# Patient Record
Sex: FEMALE | Race: WHITE | NOT HISPANIC OR LATINO | URBAN - METROPOLITAN AREA
[De-identification: names, ages, dates, MRNs, and addresses within clinical notes are randomized per-mention and may not be internally consistent; named-entity substitution may affect disease eponyms.]

---

## 2019-07-15 ENCOUNTER — OUTPATIENT (OUTPATIENT)
Dept: OUTPATIENT SERVICES | Facility: HOSPITAL | Age: 54
LOS: 1 days | Discharge: HOME | End: 2019-07-15
Payer: COMMERCIAL

## 2019-07-15 VITALS
TEMPERATURE: 98 F | DIASTOLIC BLOOD PRESSURE: 83 MMHG | SYSTOLIC BLOOD PRESSURE: 136 MMHG | OXYGEN SATURATION: 99 % | WEIGHT: 203.05 LBS | HEIGHT: 66 IN | RESPIRATION RATE: 16 BRPM | HEART RATE: 74 BPM

## 2019-07-15 DIAGNOSIS — Z90.89 ACQUIRED ABSENCE OF OTHER ORGANS: Chronic | ICD-10-CM

## 2019-07-15 DIAGNOSIS — Z98.890 OTHER SPECIFIED POSTPROCEDURAL STATES: Chronic | ICD-10-CM

## 2019-07-15 DIAGNOSIS — Z98.891 HISTORY OF UTERINE SCAR FROM PREVIOUS SURGERY: Chronic | ICD-10-CM

## 2019-07-15 LAB
ABO RH CONFIRMATION: SIGNIFICANT CHANGE UP
ALBUMIN SERPL ELPH-MCNC: 4.2 G/DL — SIGNIFICANT CHANGE UP (ref 3.3–5)
ALP SERPL-CCNC: 69 U/L — SIGNIFICANT CHANGE UP (ref 40–120)
ALT FLD-CCNC: 36 U/L — SIGNIFICANT CHANGE UP (ref 12–78)
ANION GAP SERPL CALC-SCNC: 7 MMOL/L — SIGNIFICANT CHANGE UP (ref 5–17)
APPEARANCE UR: CLEAR — SIGNIFICANT CHANGE UP
AST SERPL-CCNC: 13 U/L — LOW (ref 15–37)
BACTERIA # UR AUTO: ABNORMAL
BASOPHILS # BLD AUTO: 0.02 K/UL — SIGNIFICANT CHANGE UP (ref 0–0.2)
BASOPHILS NFR BLD AUTO: 0.3 % — SIGNIFICANT CHANGE UP (ref 0–2)
BILIRUB SERPL-MCNC: 0.3 MG/DL — SIGNIFICANT CHANGE UP (ref 0.2–1.2)
BILIRUB UR-MCNC: NEGATIVE — SIGNIFICANT CHANGE UP
BLD GP AB SCN SERPL QL: SIGNIFICANT CHANGE UP
BUN SERPL-MCNC: 24 MG/DL — HIGH (ref 7–23)
CALCIUM SERPL-MCNC: 9.5 MG/DL — SIGNIFICANT CHANGE UP (ref 8.5–10.1)
CHLORIDE SERPL-SCNC: 107 MMOL/L — SIGNIFICANT CHANGE UP (ref 96–108)
CO2 SERPL-SCNC: 29 MMOL/L — SIGNIFICANT CHANGE UP (ref 22–31)
COLOR SPEC: YELLOW — SIGNIFICANT CHANGE UP
CREAT SERPL-MCNC: 0.63 MG/DL — SIGNIFICANT CHANGE UP (ref 0.5–1.3)
DIFF PNL FLD: ABNORMAL
EOSINOPHIL # BLD AUTO: 0.09 K/UL — SIGNIFICANT CHANGE UP (ref 0–0.5)
EOSINOPHIL NFR BLD AUTO: 1.3 % — SIGNIFICANT CHANGE UP (ref 0–6)
EPI CELLS # UR: ABNORMAL
GLUCOSE SERPL-MCNC: 95 MG/DL — SIGNIFICANT CHANGE UP (ref 70–99)
GLUCOSE UR QL: NEGATIVE MG/DL — SIGNIFICANT CHANGE UP
HCT VFR BLD CALC: 41.4 % — SIGNIFICANT CHANGE UP (ref 34.5–45)
HGB BLD-MCNC: 13.6 G/DL — SIGNIFICANT CHANGE UP (ref 11.5–15.5)
IMM GRANULOCYTES NFR BLD AUTO: 0.3 % — SIGNIFICANT CHANGE UP (ref 0–1.5)
KETONES UR-MCNC: NEGATIVE — SIGNIFICANT CHANGE UP
LEUKOCYTE ESTERASE UR-ACNC: ABNORMAL
LYMPHOCYTES # BLD AUTO: 1.94 K/UL — SIGNIFICANT CHANGE UP (ref 1–3.3)
LYMPHOCYTES # BLD AUTO: 27.1 % — SIGNIFICANT CHANGE UP (ref 13–44)
MCHC RBC-ENTMCNC: 30.2 PG — SIGNIFICANT CHANGE UP (ref 27–34)
MCHC RBC-ENTMCNC: 32.9 GM/DL — SIGNIFICANT CHANGE UP (ref 32–36)
MCV RBC AUTO: 91.8 FL — SIGNIFICANT CHANGE UP (ref 80–100)
MONOCYTES # BLD AUTO: 0.61 K/UL — SIGNIFICANT CHANGE UP (ref 0–0.9)
MONOCYTES NFR BLD AUTO: 8.5 % — SIGNIFICANT CHANGE UP (ref 2–14)
NEUTROPHILS # BLD AUTO: 4.48 K/UL — SIGNIFICANT CHANGE UP (ref 1.8–7.4)
NEUTROPHILS NFR BLD AUTO: 62.5 % — SIGNIFICANT CHANGE UP (ref 43–77)
NITRITE UR-MCNC: NEGATIVE — SIGNIFICANT CHANGE UP
PH UR: 5 — SIGNIFICANT CHANGE UP (ref 5–8)
PLATELET # BLD AUTO: 182 K/UL — SIGNIFICANT CHANGE UP (ref 150–400)
POTASSIUM SERPL-MCNC: 4.5 MMOL/L — SIGNIFICANT CHANGE UP (ref 3.5–5.3)
POTASSIUM SERPL-SCNC: 4.5 MMOL/L — SIGNIFICANT CHANGE UP (ref 3.5–5.3)
PROT SERPL-MCNC: 7.4 GM/DL — SIGNIFICANT CHANGE UP (ref 6–8.3)
PROT UR-MCNC: NEGATIVE MG/DL — SIGNIFICANT CHANGE UP
RBC # BLD: 4.51 M/UL — SIGNIFICANT CHANGE UP (ref 3.8–5.2)
RBC # FLD: 12.5 % — SIGNIFICANT CHANGE UP (ref 10.3–14.5)
RBC CASTS # UR COMP ASSIST: SIGNIFICANT CHANGE UP /HPF (ref 0–4)
SODIUM SERPL-SCNC: 143 MMOL/L — SIGNIFICANT CHANGE UP (ref 135–145)
SP GR SPEC: 1.02 — SIGNIFICANT CHANGE UP (ref 1.01–1.02)
TYPE + AB SCN PNL BLD: SIGNIFICANT CHANGE UP
UROBILINOGEN FLD QL: NEGATIVE MG/DL — SIGNIFICANT CHANGE UP
WBC # BLD: 7.16 K/UL — SIGNIFICANT CHANGE UP (ref 3.8–10.5)
WBC # FLD AUTO: 7.16 K/UL — SIGNIFICANT CHANGE UP (ref 3.8–10.5)
WBC UR QL: SIGNIFICANT CHANGE UP

## 2019-07-15 PROCEDURE — 93010 ELECTROCARDIOGRAM REPORT: CPT

## 2019-07-15 NOTE — H&P PST ADULT - ASSESSMENT
54 year old female presents to PST for planned D&C hysteroscopy polypectomy 	    Plan:  1. PST instructions given ; NPO post midnight   2. Pt instructed to take following meds with sip of water : levothyroxine   3. Urine for pregnancy on day of surgery   4. Medical Optimization  with Dr Massey   5. Stop NSAIDS ( Aspirin Alev Motrin Mobic Diclofenac), herbal supplements , MVI , Vitamin fish oil 7 days prior to surgery  unless directed by surgeon or cardiologist;   6. Labs EKG done 54 year old female presents to PST for planned D&C hysteroscopy polypectomy 	    Plan:  1. PST instructions given ; NPO post midnight   2. Pt instructed to take following meds with sip of water : levothyroxine   3. Urine for pregnancy on day of surgery   4. Medical Optimization  with Dr Massey   5. Stop NSAIDS ( Aspirin Alev Motrin Mobic Diclofenac), herbal supplements , MVI , Vitamin fish oil 7 days prior to surgery  unless directed by surgeon or cardiologist;   6. Labs EKG done   7. OR booking notified Tawnya  latex allergy

## 2019-07-15 NOTE — H&P PST ADULT - HISTORY OF PRESENT ILLNESS
54 year old female with uterine polyp denies abdominal pain  presents to PST for planned D&C hysteroscopy polypectomy

## 2019-07-15 NOTE — H&P PST ADULT - NSICDXPASTMEDICALHX_GEN_ALL_CORE_FT
PAST MEDICAL HISTORY:  Abnormal EKG     Female bladder prolapse     GERD (gastroesophageal reflux disease)     Hypothyroid     OAB (overactive bladder)     Seasonal allergies PAST MEDICAL HISTORY:  Abnormal EKG     Female bladder prolapse     GERD (gastroesophageal reflux disease)     Hypothyroid     OAB (overactive bladder)     Polyp, corpus uteri     Seasonal allergies

## 2019-07-25 ENCOUNTER — OUTPATIENT (OUTPATIENT)
Dept: OUTPATIENT SERVICES | Facility: HOSPITAL | Age: 54
LOS: 1 days | Discharge: HOME | End: 2019-07-25
Payer: COMMERCIAL

## 2019-07-25 ENCOUNTER — RESULT REVIEW (OUTPATIENT)
Age: 54
End: 2019-07-25

## 2019-07-25 VITALS
RESPIRATION RATE: 12 BRPM | OXYGEN SATURATION: 100 % | HEART RATE: 60 BPM | DIASTOLIC BLOOD PRESSURE: 78 MMHG | SYSTOLIC BLOOD PRESSURE: 122 MMHG | TEMPERATURE: 98 F

## 2019-07-25 VITALS
WEIGHT: 203.05 LBS | HEART RATE: 80 BPM | SYSTOLIC BLOOD PRESSURE: 120 MMHG | DIASTOLIC BLOOD PRESSURE: 77 MMHG | TEMPERATURE: 98 F | RESPIRATION RATE: 16 BRPM | HEIGHT: 66 IN | OXYGEN SATURATION: 97 %

## 2019-07-25 DIAGNOSIS — Z98.891 HISTORY OF UTERINE SCAR FROM PREVIOUS SURGERY: Chronic | ICD-10-CM

## 2019-07-25 DIAGNOSIS — Z90.89 ACQUIRED ABSENCE OF OTHER ORGANS: Chronic | ICD-10-CM

## 2019-07-25 DIAGNOSIS — Z98.890 OTHER SPECIFIED POSTPROCEDURAL STATES: Chronic | ICD-10-CM

## 2019-07-25 LAB — HCG UR QL: NEGATIVE — SIGNIFICANT CHANGE UP

## 2019-07-25 PROCEDURE — 88305 TISSUE EXAM BY PATHOLOGIST: CPT | Mod: 26

## 2019-07-25 RX ORDER — HYDROMORPHONE HYDROCHLORIDE 2 MG/ML
0.5 INJECTION INTRAMUSCULAR; INTRAVENOUS; SUBCUTANEOUS
Refills: 0 | Status: DISCONTINUED | OUTPATIENT
Start: 2019-07-25 | End: 2019-07-25

## 2019-07-25 RX ORDER — OXYCODONE HYDROCHLORIDE 5 MG/1
10 TABLET ORAL ONCE
Refills: 0 | Status: DISCONTINUED | OUTPATIENT
Start: 2019-07-25 | End: 2019-07-25

## 2019-07-25 RX ORDER — FENTANYL CITRATE 50 UG/ML
50 INJECTION INTRAVENOUS
Refills: 0 | Status: DISCONTINUED | OUTPATIENT
Start: 2019-07-25 | End: 2019-07-25

## 2019-07-25 RX ORDER — ACETAMINOPHEN 500 MG
975 TABLET ORAL ONCE
Refills: 0 | Status: COMPLETED | OUTPATIENT
Start: 2019-07-25 | End: 2019-07-25

## 2019-07-25 RX ORDER — FAMOTIDINE 10 MG/ML
20 INJECTION INTRAVENOUS ONCE
Refills: 0 | Status: COMPLETED | OUTPATIENT
Start: 2019-07-25 | End: 2019-07-25

## 2019-07-25 RX ORDER — ONDANSETRON 8 MG/1
4 TABLET, FILM COATED ORAL ONCE
Refills: 0 | Status: DISCONTINUED | OUTPATIENT
Start: 2019-07-25 | End: 2019-07-25

## 2019-07-25 RX ORDER — SODIUM CHLORIDE 9 MG/ML
1000 INJECTION, SOLUTION INTRAVENOUS
Refills: 0 | Status: DISCONTINUED | OUTPATIENT
Start: 2019-07-25 | End: 2019-07-25

## 2019-07-25 RX ORDER — OXYCODONE HYDROCHLORIDE 5 MG/1
5 TABLET ORAL ONCE
Refills: 0 | Status: DISCONTINUED | OUTPATIENT
Start: 2019-07-25 | End: 2019-07-25

## 2019-07-25 RX ADMIN — Medication 975 MILLIGRAM(S): at 12:18

## 2019-07-25 RX ADMIN — OXYCODONE HYDROCHLORIDE 5 MILLIGRAM(S): 5 TABLET ORAL at 15:00

## 2019-07-25 RX ADMIN — FAMOTIDINE 20 MILLIGRAM(S): 10 INJECTION INTRAVENOUS at 12:17

## 2019-07-25 RX ADMIN — OXYCODONE HYDROCHLORIDE 5 MILLIGRAM(S): 5 TABLET ORAL at 15:27

## 2019-07-25 NOTE — ASU DISCHARGE PLAN (ADULT/PEDIATRIC) - CALL YOUR DOCTOR IF YOU HAVE ANY OF THE FOLLOWING:
Pain not relieved by Medications/heavy vaginal bleeding/Unable to urinate/Inability to tolerate liquids or foods

## 2019-07-25 NOTE — ASU DISCHARGE PLAN (ADULT/PEDIATRIC) - CARE PROVIDER_API CALL
Tomasz Carrera)  Obstetrics and Gynecology  [address] [place] [address]  Phone: [phone]  Fax: [phone]  Follow Up Time: 2 weeks

## 2019-07-25 NOTE — BRIEF OPERATIVE NOTE - NSICDXBRIEFPROCEDURE_GEN_ALL_CORE_FT
PROCEDURES:  Hysteroscopic polypectomy using MyoSure tissue removal system 25-Jul-2019 14:06:32  Tomasz Carrera  Dilation and curettage, uterus, fractional 25-Jul-2019 14:06:19  Tomasz Carrera  Diagnostic hysteroscopy 25-Jul-2019 14:06:12  Tomasz Carrera  Exam under anesthesia, gynecologic 25-Jul-2019 14:06:01  Tomasz Carrera

## 2019-07-25 NOTE — ASU PATIENT PROFILE, ADULT - PMH
Abnormal EKG    Female bladder prolapse    GERD (gastroesophageal reflux disease)    Hyperlipidemia    Hypothyroid    OAB (overactive bladder)    Polyp, corpus uteri    Seasonal allergies

## 2019-07-25 NOTE — BRIEF OPERATIVE NOTE - OPERATION/FINDINGS
1 cm endometrial polyp; symmetric cavity otherwise 1 cm endometrial polyp; symmetric cavity --- endometrium otherwise atrophic

## 2019-07-30 LAB — SURGICAL PATHOLOGY STUDY: SIGNIFICANT CHANGE UP

## 2019-08-01 DIAGNOSIS — N84.0 POLYP OF CORPUS UTERI: ICD-10-CM

## 2019-08-01 DIAGNOSIS — E55.9 VITAMIN D DEFICIENCY, UNSPECIFIED: ICD-10-CM

## 2019-08-01 DIAGNOSIS — Z79.82 LONG TERM (CURRENT) USE OF ASPIRIN: ICD-10-CM

## 2019-08-01 DIAGNOSIS — J30.2 OTHER SEASONAL ALLERGIC RHINITIS: ICD-10-CM

## 2019-08-01 DIAGNOSIS — K21.9 GASTRO-ESOPHAGEAL REFLUX DISEASE WITHOUT ESOPHAGITIS: ICD-10-CM

## 2019-08-01 DIAGNOSIS — Z91.040 LATEX ALLERGY STATUS: ICD-10-CM

## 2019-08-01 DIAGNOSIS — N87.9 DYSPLASIA OF CERVIX UTERI, UNSPECIFIED: ICD-10-CM

## 2019-08-01 DIAGNOSIS — N85.4 MALPOSITION OF UTERUS: ICD-10-CM

## 2019-08-01 DIAGNOSIS — E78.00 PURE HYPERCHOLESTEROLEMIA, UNSPECIFIED: ICD-10-CM

## 2019-08-01 DIAGNOSIS — K58.9 IRRITABLE BOWEL SYNDROME WITHOUT DIARRHEA: ICD-10-CM

## 2019-08-01 DIAGNOSIS — E03.9 HYPOTHYROIDISM, UNSPECIFIED: ICD-10-CM

## 2019-08-01 DIAGNOSIS — N32.81 OVERACTIVE BLADDER: ICD-10-CM

## 2020-07-01 PROBLEM — E03.9 HYPOTHYROIDISM, UNSPECIFIED: Chronic | Status: ACTIVE | Noted: 2019-07-15

## 2020-07-01 PROBLEM — N32.81 OVERACTIVE BLADDER: Chronic | Status: ACTIVE | Noted: 2019-07-15

## 2020-07-01 PROBLEM — R94.31 ABNORMAL ELECTROCARDIOGRAM [ECG] [EKG]: Chronic | Status: ACTIVE | Noted: 2019-07-15

## 2020-07-01 PROBLEM — N84.0 POLYP OF CORPUS UTERI: Chronic | Status: ACTIVE | Noted: 2019-07-15

## 2020-07-01 PROBLEM — N81.10 CYSTOCELE, UNSPECIFIED: Chronic | Status: ACTIVE | Noted: 2019-07-15

## 2020-07-01 PROBLEM — E78.5 HYPERLIPIDEMIA, UNSPECIFIED: Chronic | Status: ACTIVE | Noted: 2019-07-25

## 2020-07-01 PROBLEM — J30.2 OTHER SEASONAL ALLERGIC RHINITIS: Chronic | Status: ACTIVE | Noted: 2019-07-15

## 2020-07-01 PROBLEM — K21.9 GASTRO-ESOPHAGEAL REFLUX DISEASE WITHOUT ESOPHAGITIS: Chronic | Status: ACTIVE | Noted: 2019-07-15

## 2020-07-23 ENCOUNTER — APPOINTMENT (OUTPATIENT)
Dept: GASTROENTEROLOGY | Facility: CLINIC | Age: 55
End: 2020-07-23
Payer: COMMERCIAL

## 2020-07-23 VITALS
SYSTOLIC BLOOD PRESSURE: 122 MMHG | HEART RATE: 91 BPM | WEIGHT: 206 LBS | HEIGHT: 66 IN | BODY MASS INDEX: 33.11 KG/M2 | DIASTOLIC BLOOD PRESSURE: 85 MMHG | TEMPERATURE: 98.7 F

## 2020-07-23 DIAGNOSIS — Z86.010 PERSONAL HISTORY OF COLONIC POLYPS: ICD-10-CM

## 2020-07-23 PROCEDURE — 99202 OFFICE O/P NEW SF 15 MIN: CPT

## 2020-07-23 NOTE — ASSESSMENT
[FreeTextEntry1] : 56yo female for surveillance colonoscopy\par check colonoscopy\par Risks and benefits of procedure(s) discussed with patient in detail, including but not limited to, perforation, bleeding, reaction to anesthesia, missed lesions.\par

## 2020-07-23 NOTE — HISTORY OF PRESENT ILLNESS
[de-identified] : 56yo female for f/u hx colon polyps\par She has hx polyps with last colonscopy 5 years ago\par She is asymptomatic without bleeding or change in bowel habits

## 2020-07-23 NOTE — REASON FOR VISIT
[Initial Eval - Existing Diagnosis] : an initial evaluation of an existing diagnosis [FreeTextEntry1] : hx polyps

## 2020-07-23 NOTE — PHYSICAL EXAM
[General Appearance - In No Acute Distress] : in no acute distress [General Appearance - Alert] : alert [Sclera] : the sclera and conjunctiva were normal [Extraocular Movements] : extraocular movements were intact [PERRL With Normal Accommodation] : pupils were equal in size, round, and reactive to light [FreeTextEntry1] : left eye sty - seeing ophtho today [Neck Cervical Mass (___cm)] : no neck mass was observed [Neck Appearance] : the appearance of the neck was normal [Thyroid Diffuse Enlargement] : the thyroid was not enlarged [Jugular Venous Distention Increased] : there was no jugular-venous distention [Thyroid Nodule] : there were no palpable thyroid nodules [Auscultation Breath Sounds / Voice Sounds] : lungs were clear to auscultation bilaterally [Heart Rate And Rhythm] : heart rate was normal and rhythm regular [Heart Sounds] : normal S1 and S2 [Heart Sounds Gallop] : no gallops [Murmurs] : no murmurs [Heart Sounds Pericardial Friction Rub] : no pericardial rub [Bowel Sounds] : normal bowel sounds [Abdomen Soft] : soft [Abdomen Tenderness] : non-tender [] : no hepato-splenomegaly [Abdomen Mass (___ Cm)] : no abdominal mass palpated [Nail Clubbing] : no clubbing  or cyanosis of the fingernails [Abnormal Walk] : normal gait [Motor Tone] : muscle strength and tone were normal [Musculoskeletal - Swelling] : no joint swelling seen [Oriented To Time, Place, And Person] : oriented to person, place, and time [Affect] : the affect was normal [Impaired Insight] : insight and judgment were intact

## 2020-08-27 ENCOUNTER — LABORATORY RESULT (OUTPATIENT)
Age: 55
End: 2020-08-27

## 2020-08-31 ENCOUNTER — APPOINTMENT (OUTPATIENT)
Dept: GASTROENTEROLOGY | Facility: AMBULATORY MEDICAL SERVICES | Age: 55
End: 2020-08-31
Payer: COMMERCIAL

## 2020-08-31 PROCEDURE — 45378 DIAGNOSTIC COLONOSCOPY: CPT

## 2021-10-02 ENCOUNTER — RX RENEWAL (OUTPATIENT)
Age: 56
End: 2021-10-02

## 2022-01-10 ENCOUNTER — RX RENEWAL (OUTPATIENT)
Age: 57
End: 2022-01-10

## 2022-01-12 ENCOUNTER — APPOINTMENT (OUTPATIENT)
Dept: FAMILY MEDICINE | Facility: CLINIC | Age: 57
End: 2022-01-12
Payer: COMMERCIAL

## 2022-01-12 ENCOUNTER — MED ADMIN CHARGE (OUTPATIENT)
Age: 57
End: 2022-01-12

## 2022-01-12 VITALS
BODY MASS INDEX: 32.95 KG/M2 | HEART RATE: 88 BPM | WEIGHT: 205 LBS | TEMPERATURE: 97.6 F | OXYGEN SATURATION: 98 % | SYSTOLIC BLOOD PRESSURE: 118 MMHG | DIASTOLIC BLOOD PRESSURE: 80 MMHG | HEIGHT: 66 IN

## 2022-01-12 DIAGNOSIS — Z23 ENCOUNTER FOR IMMUNIZATION: ICD-10-CM

## 2022-01-12 DIAGNOSIS — Z76.0 ENCOUNTER FOR ISSUE OF REPEAT PRESCRIPTION: ICD-10-CM

## 2022-01-12 DIAGNOSIS — R92.1 MAMMOGRAPHIC CALCIFICATION FOUND ON DIAGNOSTIC IMAGING OF BREAST: ICD-10-CM

## 2022-01-12 PROCEDURE — G0008: CPT

## 2022-01-12 PROCEDURE — 90686 IIV4 VACC NO PRSV 0.5 ML IM: CPT

## 2022-01-12 PROCEDURE — 99214 OFFICE O/P EST MOD 30 MIN: CPT | Mod: 25

## 2022-01-12 PROCEDURE — 36415 COLL VENOUS BLD VENIPUNCTURE: CPT

## 2022-01-12 RX ORDER — CHROMIUM 200 MCG
TABLET ORAL
Refills: 0 | Status: ACTIVE | COMMUNITY

## 2022-01-12 RX ORDER — SODIUM PICOSULFATE, MAGNESIUM OXIDE, AND ANHYDROUS CITRIC ACID 10; 3.5; 12 MG/160ML; G/160ML; G/160ML
10-3.5-12 MG-GM LIQUID ORAL
Qty: 2 | Refills: 0 | Status: COMPLETED | COMMUNITY
Start: 2020-07-23 | End: 2022-01-12

## 2022-01-12 RX ORDER — ECHINACEA 400 MG
CAPSULE ORAL
Refills: 0 | Status: ACTIVE | COMMUNITY

## 2022-01-12 NOTE — HISTORY OF PRESENT ILLNESS
[FreeTextEntry1] : here for f/u anxiety and medication renewal [de-identified] : this is a 57yo pmhx anxiety on Alprazolam, reports very infrequent use of sx panic, denies she is currently attending counseling or sees psychiatry, refuses need at this time. \par hx hypothyroid/ hld, needs recheck of BW.\par o/w in no acute distress, no other major concerns and reports feeling well. \par will evaluate and manage as appropriate. \par

## 2022-01-12 NOTE — ASSESSMENT
[FreeTextEntry1] : anxiety\par well managed on Alprazolam\par counseled on use, reports infrequent use and tolerates well\par denies need for psych or counseling at this time\par medication renewal provided as requested \par in no acute distress and o/w offers no complaints \par \par hld\par conservative mgmt/ lifestyle modification\par recheck fasting BW lipid/ cmp/ a1c\par in no acute distress and o/w offers no complaints \par \par hypothyroid\par on Levothyroxine, renewal recently provided\par recheck thyroid values, ensure therapeutic \par in no acute distress and o/w offers no complaints \par \par preventative health\par annual wellness- scheduling\par flu- counseled on vaccine, administered today, tolerated well. shingles- refused\par colonoscopy- 2021, normal \par ophthalmology dilated eye exam- Oct. 2021\par GYN for Pap- 2021, normal \par mammo- 2021, normal, completes with Dr. Carrera GYN\par DEXA- 2019, normal, completes with Dr. Carrera GYN\par continued follow up with PCP for chronic concerns and preventative health management. \par patient verbalized understanding and agrees with plan of care.

## 2022-01-12 NOTE — COUNSELING
[Fall prevention counseling provided] : Fall prevention counseling provided [Behavioral health counseling provided] : Behavioral health counseling provided [Sleep ___ hours/day] : Sleep [unfilled] hours/day [Engage in a relaxing activity] : Engage in a relaxing activity [Plan in advance] : Plan in advance [None] : None [Good understanding] : Patient has a good understanding of lifestyle changes and steps needed to achieve self management goal [de-identified] : Depression/Anxiety are mood disorders. The symptoms of depression/anxiety can affect how you think and feel and how you handle every day activities (work, eating, sleeping). Multiple treatments are available such as psychotherapy/counseling, medications called antidepressants or anxiolytics, or other therapies. \par Some medications can take 2-4 weeks to work and can have side effects; notify our office if you experience any side effects. Suicidal thoughts or attempt may occur in some people, especially if agitated when first initiating medication, before it begins to work; if suicidal thoughts/ideations or suicidal attempt- call 911 for emergency services/go to the nearest emergency department. \par IF IN CRISIS YOU HAVE SUPPORT= CALL 911/EMERGENCY SERVICES, CALL [facility] [phone], CALL OUR OFFICE. \par \par Maintain balanced diet of whole grain, fruits and vegetables, lean meats, skinless poultry, fish, beans, soy products, eggs, nuts, and low fat dairy as per FDA dietary guidelines. \par Avoid high calorie/low nutrient foods. \par vegetables/fruits- at least 5 servings/day, \par whole grains- 100% whole grain and at least 3 grams fiber/day.\par reduce/eliminate saturated fat- less than 5% on nutrition labels (ex. castro, deli meat, hot dogs, fried foods, cookies, ice cream, chips, soft drinks, etc.)\par stay within your FDA recommended daily calorie needs or use the plate method to portion intake. \par Avoid fast food and limit eating out. When eating out choose healthy alternatives (ex. grilled, baked, steamed. low fat dressings. avoid french fries).\par DO NOT CONSUME FOODS YOU ARE ALLERGIC TO DESPITE DIETARY RECOMMENDATIONS.\par \par For more information you can visit [email or web address] \par \par Incorporate regular physical activity most days of the week. \par Regular aerobic activity- moderate intensity, most days/wk, at least 30min/day- ex. brisk walk 2.5miles/hr, ballroom dancing, water aerobics, light yard work (raking/lawn mowing) actively playing basketball, biking 10miles/hr.\par Muscle strengthening and strength/resistance exercises 2-3days/wk- ex. free weights, resistance bands, your own weight (squats/pull-ups/push-ups).\par Neuro-motor exercises for balance/agility/coordination and flexibility exercises 2-3days/wk, 20-30min/day- ex. yoga and vicky-chi.\par Bone strengthening at least 30min/day, most days of the week- ex. weights, resistance bands, running, brisk walking, jumping rope, stair climbing, tennis.\par Decrease sedentary time. \par LISTEN TO YOUR BODY AND MODIFY ACTIVITY AS NEEDED- DO NOT OVEREXERT YOURSELF DURING PHYSICAL ACTIVITY DESPITE RECOMMENDATION.\par \par For more information you can visit [email or web address].gov

## 2022-01-12 NOTE — PHYSICAL EXAM
[No Acute Distress] : no acute distress [Well Nourished] : well nourished [Well Developed] : well developed [No Respiratory Distress] : no respiratory distress  [No Accessory Muscle Use] : no accessory muscle use [Clear to Auscultation] : lungs were clear to auscultation bilaterally [Normal Posterior Cervical Nodes] : no posterior cervical lymphadenopathy [Normal Anterior Cervical Nodes] : no anterior cervical lymphadenopathy [No Rash] : no rash [Coordination Grossly Intact] : coordination grossly intact [Normal Gait] : normal gait [Normal Mental Status] : the patient's orientation, memory, attention, language and fund of knowledge were normal [Appropriate] : appropriate [Normal Rate] : a normal rate [Normal Rhythm] : a normal rhythm [Normal Tone] : normal tone [Normal Volume] : normal volume [Normal] : normal throught processes [Normal Associations] :  no deficiency [Impaired judgment] : intact judgment [Impaired Insight] : intact insight [Delusions] : exhibited no delusions [Hallucinations] : exhibited no hallucinations [Obsessions] : denied obsessions [Preoccupation with Violence] : denied preoccupation with violent thoughts [Suicidal Ideation] : denied suicidal ideation [Suicidal Intent] : denied suicidal intent [Suicidal Plan] : denied suicidal plans [Homicidal Ideation] : denied homicidal ideation [Homicidal Intent] : denied homicidal intention [Homicidal Plan] : denied homicidal plans

## 2022-01-12 NOTE — HEALTH RISK ASSESSMENT
[Never] : Never [No] : In the past 12 months have you used drugs other than those required for medical reasons? No [No falls in past year] : Patient reported no falls in the past year [0] : 2) Feeling down, depressed, or hopeless: Not at all (0) [PHQ-2 Negative - No further assessment needed] : PHQ-2 Negative - No further assessment needed [Patient/Caregiver not ready to engage] : , patient/caregiver not ready to engage [de-identified] : active [de-identified] : well balanced [FPQ8Bczhj] : 0 [AdvancecareDate] : 01/22

## 2022-01-13 LAB
ALBUMIN SERPL ELPH-MCNC: 5 G/DL
ALP BLD-CCNC: 77 U/L
ALT SERPL-CCNC: 25 U/L
ANION GAP SERPL CALC-SCNC: 13 MMOL/L
AST SERPL-CCNC: 20 U/L
BILIRUB SERPL-MCNC: 0.6 MG/DL
BUN SERPL-MCNC: 20 MG/DL
CALCIUM SERPL-MCNC: 11 MG/DL
CHLORIDE SERPL-SCNC: 100 MMOL/L
CHOLEST SERPL-MCNC: 269 MG/DL
CO2 SERPL-SCNC: 27 MMOL/L
CREAT SERPL-MCNC: 0.81 MG/DL
ESTIMATED AVERAGE GLUCOSE: 103 MG/DL
GLUCOSE SERPL-MCNC: 102 MG/DL
HBA1C MFR BLD HPLC: 5.2 %
HDLC SERPL-MCNC: 55 MG/DL
LDLC SERPL CALC-MCNC: 189 MG/DL
NONHDLC SERPL-MCNC: 214 MG/DL
POTASSIUM SERPL-SCNC: 5.3 MMOL/L
PROT SERPL-MCNC: 7.4 G/DL
SODIUM SERPL-SCNC: 140 MMOL/L
T4 FREE SERPL-MCNC: 2 NG/DL
TRIGL SERPL-MCNC: 127 MG/DL
TSH SERPL-ACNC: 0.75 UIU/ML

## 2022-02-18 ENCOUNTER — APPOINTMENT (OUTPATIENT)
Dept: FAMILY MEDICINE | Facility: CLINIC | Age: 57
End: 2022-02-18
Payer: COMMERCIAL

## 2022-02-18 PROCEDURE — 36415 COLL VENOUS BLD VENIPUNCTURE: CPT

## 2022-02-23 LAB
ALBUMIN SERPL ELPH-MCNC: 4.8 G/DL
ALP BLD-CCNC: 76 U/L
ALT SERPL-CCNC: 22 U/L
ANION GAP SERPL CALC-SCNC: 12 MMOL/L
AST SERPL-CCNC: 20 U/L
BILIRUB SERPL-MCNC: 0.4 MG/DL
BUN SERPL-MCNC: 17 MG/DL
CALCIUM SERPL-MCNC: 10.6 MG/DL
CHLORIDE SERPL-SCNC: 106 MMOL/L
CO2 SERPL-SCNC: 28 MMOL/L
CREAT SERPL-MCNC: 0.78 MG/DL
GLUCOSE SERPL-MCNC: 97 MG/DL
POTASSIUM SERPL-SCNC: 5.7 MMOL/L
PROT SERPL-MCNC: 7.2 G/DL
SODIUM SERPL-SCNC: 146 MMOL/L
T4 FREE SERPL-MCNC: 1.3 NG/DL
TSH SERPL-ACNC: 3.08 UIU/ML

## 2022-04-07 ENCOUNTER — APPOINTMENT (OUTPATIENT)
Dept: FAMILY MEDICINE | Facility: CLINIC | Age: 57
End: 2022-04-07
Payer: COMMERCIAL

## 2022-04-07 ENCOUNTER — NON-APPOINTMENT (OUTPATIENT)
Age: 57
End: 2022-04-07

## 2022-04-07 VITALS
WEIGHT: 207 LBS | OXYGEN SATURATION: 98 % | TEMPERATURE: 97.1 F | BODY MASS INDEX: 33.27 KG/M2 | DIASTOLIC BLOOD PRESSURE: 88 MMHG | SYSTOLIC BLOOD PRESSURE: 140 MMHG | HEIGHT: 66 IN | HEART RATE: 74 BPM

## 2022-04-07 VITALS — SYSTOLIC BLOOD PRESSURE: 130 MMHG | DIASTOLIC BLOOD PRESSURE: 88 MMHG

## 2022-04-07 DIAGNOSIS — J30.2 OTHER SEASONAL ALLERGIC RHINITIS: ICD-10-CM

## 2022-04-07 PROCEDURE — 93000 ELECTROCARDIOGRAM COMPLETE: CPT

## 2022-04-07 PROCEDURE — 36415 COLL VENOUS BLD VENIPUNCTURE: CPT

## 2022-04-07 PROCEDURE — 99396 PREV VISIT EST AGE 40-64: CPT | Mod: 25

## 2022-04-07 NOTE — ASSESSMENT
[FreeTextEntry1] : PRINCE NICHOLSON is a 57 year old female here for a physical exam. She is here to follow up on the above listed conditions. She has been compliant with medications, diet and exercise. She is here today to repeat her labs. \par \par She had labs done in January showing elevated cholesterol. She was advised to work on diet and exercise and return in 3 months to recheck her labs. Her sodium, potassium, and calcium were all elevated in January as well. She was advised to decrease her calcium supplement and repeat her labs. She also had a low-normal TSH and elevated Free T4 in January. Her Levothyroxine dose was adjusted and she had repeat labs 3 months later which were improved so she is not due for repeat thyroid labs today.\par \par Her EKG is abnormal but unchanged from previous. She has seen a cardiologist in the past and had a stress test 4-5 years ago.

## 2022-04-07 NOTE — PLAN
[FreeTextEntry1] : Continue all medications as prescribed. Check labs as above. Will adjust any medications based upon lab results.\par \par Reviewed age-appropriate preventive screening tests with patient. She is due for Shingrix. She would like to wait until after Easter to have this done.\par \par Discussed clean eating (e.g. Mediterranean style diet) and recommendations for regular exercise/staying as physically active as possible.\par \par Reviewed importance of good self care (e.g. meditation, yoga, adequate rest, regular exercise, magnesium, clean eating, etc.).\par \par Follow up for next physical in one year.

## 2022-04-07 NOTE — HISTORY OF PRESENT ILLNESS
[FreeTextEntry1] : PRINCE NICHOLSON is a 57 year old female here for a physical exam.  [de-identified] : Her last physical exam was last year\par \par Vaccines:\par Tetanus is up to date\par Shingrix: never\par COVID vaccine is up to date\par \par Her last dentist visit was less than one year ago\par Her last eye doctor appointment was 2 years ago\par Her last dermatologist visit was less than one year ago\par \par GYN visit is up to date\par Mammogram is up to date\par Colon cancer screening is up to date\par DEXA: is up to date\par \par Her diet is healthy overall\par Exercise: walking

## 2022-04-09 LAB
ALBUMIN SERPL ELPH-MCNC: 5 G/DL
ALP BLD-CCNC: 84 U/L
ALT SERPL-CCNC: 22 U/L
ANION GAP SERPL CALC-SCNC: 12 MMOL/L
AST SERPL-CCNC: 19 U/L
BILIRUB SERPL-MCNC: 0.6 MG/DL
BUN SERPL-MCNC: 15 MG/DL
CALCIUM SERPL-MCNC: 10.3 MG/DL
CHLORIDE SERPL-SCNC: 102 MMOL/L
CHOLEST SERPL-MCNC: 269 MG/DL
CO2 SERPL-SCNC: 27 MMOL/L
CREAT SERPL-MCNC: 0.77 MG/DL
EGFR: 89 ML/MIN/1.73M2
GLUCOSE SERPL-MCNC: 96 MG/DL
HDLC SERPL-MCNC: 68 MG/DL
LDLC SERPL CALC-MCNC: 186 MG/DL
NONHDLC SERPL-MCNC: 201 MG/DL
POTASSIUM SERPL-SCNC: 5 MMOL/L
PROT SERPL-MCNC: 7.3 G/DL
SODIUM SERPL-SCNC: 141 MMOL/L
TRIGL SERPL-MCNC: 75 MG/DL

## 2022-04-12 ENCOUNTER — NON-APPOINTMENT (OUTPATIENT)
Age: 57
End: 2022-04-12

## 2022-06-16 ENCOUNTER — FORM ENCOUNTER (OUTPATIENT)
Age: 57
End: 2022-06-16

## 2022-06-19 ENCOUNTER — FORM ENCOUNTER (OUTPATIENT)
Age: 57
End: 2022-06-19
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
## 2022-08-24 ENCOUNTER — FORM ENCOUNTER (OUTPATIENT)
Age: 57
End: 2022-08-24

## 2022-09-26 ENCOUNTER — RX RENEWAL (OUTPATIENT)
Age: 57
End: 2022-09-26

## 2022-10-10 ENCOUNTER — APPOINTMENT (OUTPATIENT)
Dept: FAMILY MEDICINE | Facility: CLINIC | Age: 57
End: 2022-10-10

## 2022-10-10 VITALS
HEIGHT: 66 IN | WEIGHT: 210 LBS | SYSTOLIC BLOOD PRESSURE: 122 MMHG | BODY MASS INDEX: 33.75 KG/M2 | DIASTOLIC BLOOD PRESSURE: 78 MMHG | HEART RATE: 72 BPM | OXYGEN SATURATION: 99 % | TEMPERATURE: 97.6 F

## 2022-10-10 DIAGNOSIS — F41.9 ANXIETY DISORDER, UNSPECIFIED: ICD-10-CM

## 2022-10-10 PROCEDURE — 36415 COLL VENOUS BLD VENIPUNCTURE: CPT

## 2022-10-10 PROCEDURE — 99213 OFFICE O/P EST LOW 20 MIN: CPT | Mod: 25

## 2022-10-10 RX ORDER — EXEMESTANE 25 MG/1
25 TABLET ORAL
Refills: 0 | Status: DISCONTINUED | COMMUNITY
End: 2022-10-10

## 2022-10-10 NOTE — ASSESSMENT
[FreeTextEntry1] : Patient is a 56yo female with PMH HLD, hypothyroidism, anxiety, breast cancer who presents to the office for follow up. \par \par HLD\par - Fasting labs drawn in office.\par - Further recommendations pending b/w results.\par - Limit/avoid greasy foods, fried foods, fatty foods, and saturated fat in your diet and try and eat more lean proteins.\par \par Hypothyroidism\par - Labs normal in 02/2022, defer testing today.\par - Continue Levothyroxine 125mcg daily.\par \par Anxiety\par - Uses Xanax sparingly as needed.\par - RF provided.\par - Consider Psychiatry/Therapist f/u or alternate medication if Xanax use increases.\par \par Call the office or go to the ED immediately if you develop new, worsening or concerning symptoms including high fever, severe headache/worst headache of your life, confusion, dizziness/lightheadedness, loss of consciousness, severe chest pain, difficulty breathing, shortness of breath, severe abdominal pain, excessive vomiting/diarrhea, inability to feel/move the extremities, or any other concerning symptoms.\par

## 2022-10-10 NOTE — HEALTH RISK ASSESSMENT
[Never] : Never [Yes] : Yes [4 or more  times a week (4 pts)] : 4 or more  times a week (4 points) [1 or 2 (0 pts)] : 1 or 2 (0 points) [Never (0 pts)] : Never (0 points) [No] : In the past 12 months have you used drugs other than those required for medical reasons? No [No falls in past year] : Patient reported no falls in the past year [0] : 2) Feeling down, depressed, or hopeless: Not at all (0) [PHQ-2 Negative - No further assessment needed] : PHQ-2 Negative - No further assessment needed [With Patient/Caregiver] : , with patient/caregiver same name as above [Reviewed no changes] : Reviewed, no changes [I will adhere to the patient's wishes.] : I will adhere to the patient's wishes. [Audit-CScore] : 4 [de-identified] : walks regularly [de-identified] : fair [OFI0Smzqi] : 0

## 2022-10-11 LAB
ALBUMIN SERPL ELPH-MCNC: 4.6 G/DL
ALP BLD-CCNC: 65 U/L
ALT SERPL-CCNC: 19 U/L
ANION GAP SERPL CALC-SCNC: 10 MMOL/L
AST SERPL-CCNC: 19 U/L
BILIRUB SERPL-MCNC: 0.4 MG/DL
BUN SERPL-MCNC: 13 MG/DL
CALCIUM SERPL-MCNC: 9.8 MG/DL
CHLORIDE SERPL-SCNC: 105 MMOL/L
CHOLEST SERPL-MCNC: 232 MG/DL
CO2 SERPL-SCNC: 27 MMOL/L
CREAT SERPL-MCNC: 0.74 MG/DL
EGFR: 93 ML/MIN/1.73M2
GLUCOSE SERPL-MCNC: 101 MG/DL
HDLC SERPL-MCNC: 62 MG/DL
LDLC SERPL CALC-MCNC: 152 MG/DL
NONHDLC SERPL-MCNC: 170 MG/DL
POTASSIUM SERPL-SCNC: 5.3 MMOL/L
PROT SERPL-MCNC: 6.9 G/DL
SODIUM SERPL-SCNC: 142 MMOL/L
TRIGL SERPL-MCNC: 89 MG/DL

## 2022-10-12 ENCOUNTER — NON-APPOINTMENT (OUTPATIENT)
Age: 57
End: 2022-10-12

## 2023-01-11 ENCOUNTER — RESULT CHARGE (OUTPATIENT)
Age: 58
End: 2023-01-11

## 2023-01-11 ENCOUNTER — APPOINTMENT (OUTPATIENT)
Dept: OBGYN | Facility: CLINIC | Age: 58
End: 2023-01-11
Payer: COMMERCIAL

## 2023-01-11 VITALS — DIASTOLIC BLOOD PRESSURE: 77 MMHG | WEIGHT: 205 LBS | SYSTOLIC BLOOD PRESSURE: 111 MMHG | BODY MASS INDEX: 33.09 KG/M2

## 2023-01-11 LAB
BILIRUB UR QL STRIP: NORMAL
CLARITY UR: CLEAR
COLLECTION METHOD: NORMAL
GLUCOSE UR-MCNC: NORMAL
HCG UR QL: 0.2 EU/DL
HEMOGLOBIN: 12.2
HGB UR QL STRIP.AUTO: NORMAL
KETONES UR-MCNC: NORMAL
LEUKOCYTE ESTERASE UR QL STRIP: NORMAL
NITRITE UR QL STRIP: NORMAL
PH UR STRIP: 5.5
PROT UR STRIP-MCNC: NORMAL
SP GR UR STRIP: 1.02

## 2023-01-11 PROCEDURE — 85018 HEMOGLOBIN: CPT | Mod: QW

## 2023-01-11 PROCEDURE — 81003 URINALYSIS AUTO W/O SCOPE: CPT | Mod: QW

## 2023-01-11 PROCEDURE — 82270 OCCULT BLOOD FECES: CPT

## 2023-01-11 PROCEDURE — 99396 PREV VISIT EST AGE 40-64: CPT

## 2023-01-11 NOTE — HISTORY OF PRESENT ILLNESS
[TextBox_4] : Patient is a 56 yo female here today for annual visit. She is s/p Breast Lumpectomy. she is followed by Dr. Izquierdo. Patient seeing dr. patel following her mammogram. \par +DEANNE marker, puts her at risk for breast and pancreatic cancer. \par patient has hx of Left ovarian cyst 3cm, she had a sonogram 8/21, will repeat now. \par \par patient complains of  AI with a large GH 4.5cm \par \par patient was on aromatase inhibiter, is on tamoxifen has been on for a little less than a year, plan tx for 5 years. \par

## 2023-01-11 NOTE — PLAN
[FreeTextEntry1] : \par \par Patient to follow up in 1 year for annual GYN exam\par Mammogram and bilateral breast US due:  per FS \par Colonoscopy due:  2/26 \par Bone density due:  now Rx given \par \par Plan transvaginal sonogram to evaluate ovarian cyst, she is to schedule apt here in the next few weeks. \par \par \par Pap ordered\par Hemoccult ordered \par All questions answered, patient agreeable with plan.\par \par \par \par I Catina North FNP-C am scribing for the presence of Dr. Carrera the following sections HISTORY OF PRESENT ILLNESS, PAST MEDICAL/FAMILY/SOCIAL HISTORY; REVIEW OF SYSTEMS; VITAL SIGNS; PHYSICAL EXAM; DISPOSITION. \par \par \par I personally performed the services described in the documentation, reviewed the documentation recorded by the scribe in my presence and it accurately and completely records my words and actions.\par \par

## 2023-01-11 NOTE — PHYSICAL EXAM
[Chaperone Present] : A chaperone was present in the examining room during all aspects of the physical examination [Appropriately responsive] : appropriately responsive [Alert] : alert [No Lymphadenopathy] : no lymphadenopathy [Examination Of The Breasts] : a normal appearance [No Discharge] : no discharge [No Masses] : no breast masses were palpable [Labia Majora] : normal [Labia Minora] : normal [No Bleeding] : There was no active vaginal bleeding [Normal] : normal [Uterine Adnexae] : normal [Normal rectal exam] : was normal [FreeTextEntry1] : Catina BRADSHAW-NICHOLE chaperoned during entire physical exam [Occult Blood Positive] : was negative for occult blood analysis [FreeTextEntry9] : rectal tone present

## 2023-01-16 LAB — CYTOLOGY CVX/VAG DOC THIN PREP: NORMAL

## 2023-01-26 ENCOUNTER — ASOB RESULT (OUTPATIENT)
Age: 58
End: 2023-01-26

## 2023-01-26 ENCOUNTER — APPOINTMENT (OUTPATIENT)
Dept: ANTEPARTUM | Facility: CLINIC | Age: 58
End: 2023-01-26
Payer: COMMERCIAL

## 2023-01-26 ENCOUNTER — APPOINTMENT (OUTPATIENT)
Dept: OBGYN | Facility: CLINIC | Age: 58
End: 2023-01-26
Payer: COMMERCIAL

## 2023-01-26 PROCEDURE — 76856 US EXAM PELVIC COMPLETE: CPT | Mod: 59

## 2023-01-26 PROCEDURE — 76830 TRANSVAGINAL US NON-OB: CPT | Mod: 59

## 2023-01-26 PROCEDURE — 99213 OFFICE O/P EST LOW 20 MIN: CPT

## 2023-01-26 NOTE — PLAN
[FreeTextEntry1] : \par \par Plan to monitor Right ovarian cyst yearly with annual visits.\par due to patients thickened endometrial lining plan for sono hystogram. pending results will decide on D&C or endometrial biopsy\par all of her questions were answered she is agreeable with plan\par \par \par

## 2023-01-26 NOTE — HISTORY OF PRESENT ILLNESS
[FreeTextEntry1] : Patient is a 57yo female here today for pelvic sonogram to evaluate Left ovarian cyst which was found on sonogram in august. \par \par 8/2022 Sonogram revealed a 3.9 cm right ovarian cyst\par \par Today sonogram Right  ovarian cyst 1.7  x  1.9  x 1.8 cm \par \par left ovary not seen due to bowel gas\par \par her endometrial lining measured 10.9mm she is currently on tamoxifen she denies any PMB.  \par

## 2023-02-14 ENCOUNTER — FORM ENCOUNTER (OUTPATIENT)
Age: 58
End: 2023-02-14

## 2023-02-15 ENCOUNTER — APPOINTMENT (OUTPATIENT)
Dept: OBGYN | Facility: CLINIC | Age: 58
End: 2023-02-15
Payer: SELF-PAY

## 2023-02-15 ENCOUNTER — ASOB RESULT (OUTPATIENT)
Age: 58
End: 2023-02-15

## 2023-02-15 ENCOUNTER — APPOINTMENT (OUTPATIENT)
Dept: ANTEPARTUM | Facility: CLINIC | Age: 58
End: 2023-02-15
Payer: COMMERCIAL

## 2023-02-15 PROCEDURE — 76856 US EXAM PELVIC COMPLETE: CPT | Mod: 59

## 2023-02-15 PROCEDURE — 76830 TRANSVAGINAL US NON-OB: CPT | Mod: 59

## 2023-02-15 PROCEDURE — 99213 OFFICE O/P EST LOW 20 MIN: CPT

## 2023-02-15 NOTE — HISTORY OF PRESENT ILLNESS
[FreeTextEntry1] : pt presented for sonohyst today for thickened endometrium. pt on tamoxifen for 6 months. See procedure note. \par \par \par sonohysterogram was performed today by Dr. Carrera with transvaginal guidance\par The endometrial lining measures 8.0mm\par a fibroid vs polyp was visualized in the endometrial lining measuring 1.0 x 0.8 cm\par the adnexa is WNL \par no free fluid seen.

## 2023-02-21 ENCOUNTER — RX RENEWAL (OUTPATIENT)
Age: 58
End: 2023-02-21

## 2023-03-30 ENCOUNTER — NON-APPOINTMENT (OUTPATIENT)
Age: 58
End: 2023-03-30

## 2023-04-07 ENCOUNTER — OUTPATIENT (OUTPATIENT)
Dept: OUTPATIENT SERVICES | Facility: HOSPITAL | Age: 58
LOS: 1 days | End: 2023-04-07
Payer: COMMERCIAL

## 2023-04-07 VITALS
WEIGHT: 209.44 LBS | HEART RATE: 66 BPM | RESPIRATION RATE: 14 BRPM | SYSTOLIC BLOOD PRESSURE: 128 MMHG | HEIGHT: 65 IN | OXYGEN SATURATION: 99 % | TEMPERATURE: 98 F | DIASTOLIC BLOOD PRESSURE: 75 MMHG

## 2023-04-07 DIAGNOSIS — K08.409 PARTIAL LOSS OF TEETH, UNSPECIFIED CAUSE, UNSPECIFIED CLASS: Chronic | ICD-10-CM

## 2023-04-07 DIAGNOSIS — Z98.891 HISTORY OF UTERINE SCAR FROM PREVIOUS SURGERY: Chronic | ICD-10-CM

## 2023-04-07 DIAGNOSIS — Z98.890 OTHER SPECIFIED POSTPROCEDURAL STATES: Chronic | ICD-10-CM

## 2023-04-07 DIAGNOSIS — Z90.89 ACQUIRED ABSENCE OF OTHER ORGANS: Chronic | ICD-10-CM

## 2023-04-07 DIAGNOSIS — R93.89 ABNORMAL FINDINGS ON DIAGNOSTIC IMAGING OF OTHER SPECIFIED BODY STRUCTURES: ICD-10-CM

## 2023-04-07 DIAGNOSIS — Z01.818 ENCOUNTER FOR OTHER PREPROCEDURAL EXAMINATION: ICD-10-CM

## 2023-04-07 LAB
ANION GAP SERPL CALC-SCNC: 0 MMOL/L — LOW (ref 5–17)
APPEARANCE UR: CLEAR — SIGNIFICANT CHANGE UP
BACTERIA # UR AUTO: ABNORMAL
BASOPHILS # BLD AUTO: 0.02 K/UL — SIGNIFICANT CHANGE UP (ref 0–0.2)
BASOPHILS NFR BLD AUTO: 0.4 % — SIGNIFICANT CHANGE UP (ref 0–2)
BILIRUB UR-MCNC: NEGATIVE — SIGNIFICANT CHANGE UP
BLD GP AB SCN SERPL QL: SIGNIFICANT CHANGE UP
BUN SERPL-MCNC: 16 MG/DL — SIGNIFICANT CHANGE UP (ref 7–23)
CALCIUM SERPL-MCNC: 9 MG/DL — SIGNIFICANT CHANGE UP (ref 8.5–10.1)
CHLORIDE SERPL-SCNC: 112 MMOL/L — HIGH (ref 96–108)
CO2 SERPL-SCNC: 30 MMOL/L — SIGNIFICANT CHANGE UP (ref 22–31)
COLOR SPEC: YELLOW — SIGNIFICANT CHANGE UP
CREAT SERPL-MCNC: 0.77 MG/DL — SIGNIFICANT CHANGE UP (ref 0.5–1.3)
DIFF PNL FLD: ABNORMAL
EGFR: 88 ML/MIN/1.73M2 — SIGNIFICANT CHANGE UP
EOSINOPHIL # BLD AUTO: 0.17 K/UL — SIGNIFICANT CHANGE UP (ref 0–0.5)
EOSINOPHIL NFR BLD AUTO: 3.6 % — SIGNIFICANT CHANGE UP (ref 0–6)
EPI CELLS # UR: ABNORMAL
GLUCOSE SERPL-MCNC: 104 MG/DL — HIGH (ref 70–99)
GLUCOSE UR QL: NEGATIVE — SIGNIFICANT CHANGE UP
HCT VFR BLD CALC: 41.7 % — SIGNIFICANT CHANGE UP (ref 34.5–45)
HGB BLD-MCNC: 13.4 G/DL — SIGNIFICANT CHANGE UP (ref 11.5–15.5)
IMM GRANULOCYTES NFR BLD AUTO: 0.2 % — SIGNIFICANT CHANGE UP (ref 0–0.9)
KETONES UR-MCNC: NEGATIVE — SIGNIFICANT CHANGE UP
LEUKOCYTE ESTERASE UR-ACNC: NEGATIVE — SIGNIFICANT CHANGE UP
LYMPHOCYTES # BLD AUTO: 1.69 K/UL — SIGNIFICANT CHANGE UP (ref 1–3.3)
LYMPHOCYTES # BLD AUTO: 35.6 % — SIGNIFICANT CHANGE UP (ref 13–44)
MCHC RBC-ENTMCNC: 30.7 PG — SIGNIFICANT CHANGE UP (ref 27–34)
MCHC RBC-ENTMCNC: 32.1 GM/DL — SIGNIFICANT CHANGE UP (ref 32–36)
MCV RBC AUTO: 95.6 FL — SIGNIFICANT CHANGE UP (ref 80–100)
MONOCYTES # BLD AUTO: 0.4 K/UL — SIGNIFICANT CHANGE UP (ref 0–0.9)
MONOCYTES NFR BLD AUTO: 8.4 % — SIGNIFICANT CHANGE UP (ref 2–14)
NEUTROPHILS # BLD AUTO: 2.46 K/UL — SIGNIFICANT CHANGE UP (ref 1.8–7.4)
NEUTROPHILS NFR BLD AUTO: 51.8 % — SIGNIFICANT CHANGE UP (ref 43–77)
NITRITE UR-MCNC: NEGATIVE — SIGNIFICANT CHANGE UP
PH UR: 6 — SIGNIFICANT CHANGE UP (ref 5–8)
PLATELET # BLD AUTO: 177 K/UL — SIGNIFICANT CHANGE UP (ref 150–400)
POTASSIUM SERPL-MCNC: 4.7 MMOL/L — SIGNIFICANT CHANGE UP (ref 3.5–5.3)
POTASSIUM SERPL-SCNC: 4.7 MMOL/L — SIGNIFICANT CHANGE UP (ref 3.5–5.3)
PROT UR-MCNC: NEGATIVE — SIGNIFICANT CHANGE UP
RBC # BLD: 4.36 M/UL — SIGNIFICANT CHANGE UP (ref 3.8–5.2)
RBC # FLD: 12.9 % — SIGNIFICANT CHANGE UP (ref 10.3–14.5)
RBC CASTS # UR COMP ASSIST: ABNORMAL /HPF (ref 0–4)
SODIUM SERPL-SCNC: 142 MMOL/L — SIGNIFICANT CHANGE UP (ref 135–145)
SP GR SPEC: 1.01 — SIGNIFICANT CHANGE UP (ref 1.01–1.02)
UROBILINOGEN FLD QL: NEGATIVE — SIGNIFICANT CHANGE UP
WBC # BLD: 4.75 K/UL — SIGNIFICANT CHANGE UP (ref 3.8–10.5)
WBC # FLD AUTO: 4.75 K/UL — SIGNIFICANT CHANGE UP (ref 3.8–10.5)
WBC UR QL: SIGNIFICANT CHANGE UP /HPF (ref 0–5)

## 2023-04-07 PROCEDURE — 86900 BLOOD TYPING SEROLOGIC ABO: CPT

## 2023-04-07 PROCEDURE — 36415 COLL VENOUS BLD VENIPUNCTURE: CPT

## 2023-04-07 PROCEDURE — 86850 RBC ANTIBODY SCREEN: CPT

## 2023-04-07 PROCEDURE — 93010 ELECTROCARDIOGRAM REPORT: CPT

## 2023-04-07 PROCEDURE — 81001 URINALYSIS AUTO W/SCOPE: CPT

## 2023-04-07 PROCEDURE — 85025 COMPLETE CBC W/AUTO DIFF WBC: CPT

## 2023-04-07 PROCEDURE — 93005 ELECTROCARDIOGRAM TRACING: CPT

## 2023-04-07 PROCEDURE — 86901 BLOOD TYPING SEROLOGIC RH(D): CPT

## 2023-04-07 PROCEDURE — 80048 BASIC METABOLIC PNL TOTAL CA: CPT
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
## 2023-04-07 NOTE — H&P PST ADULT - HISTORY OF PRESENT ILLNESS
58 year old female with strong family history of breast cancer, on tamoxifen, found to have thickened endometrium and possible cyst.  Patient also follows with [facility] for pancreatic screening due to family history of pancreatic cancer. Patient presents to [facility] today for planned hysteroscopy dilation and curettage on April 14, 2023 with Dr. Carrera.     58 year old female with strong family history of breast cancer, on tamoxifen, found to have thickened endometrium and possible fibroid.  Patient follows with [facility] for pancreatic screening due to strong family history of pancreatic cancer.  Patient presents to [facility] today for planned hysteroscopy dilation and curettage on April 14, 2023 with Dr. Carrera.
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
## 2023-04-07 NOTE — H&P PST ADULT - NSICDXFAMILYHX_GEN_ALL_CORE_FT
FAMILY HISTORY:  Mother  Still living? Unknown  FH: pancreatic cancer, Age at diagnosis: Age Unknown  FHx: breast cancer, Age at diagnosis: Age Unknown    Sibling  Still living? Yes, Estimated age: 61-70  FH: pancreatic cancer, Age at diagnosis: Age Unknown  FH: type 2 diabetes, Age at diagnosis: Age Unknown  FHx: breast cancer, Age at diagnosis: Age Unknown    Grandparent  Still living? Unknown  FHx: breast cancer, Age at diagnosis: Age Unknown

## 2023-04-07 NOTE — H&P PST ADULT - MUSCULOSKELETAL
no calf tenderness/normal gait/strength 5/5 bilateral upper extremities/strength 5/5 bilateral lower extremities negative

## 2023-04-07 NOTE — H&P PST ADULT - ASSESSMENT
58 year old female with strong family history of breast cancer, on tamoxifen, found to have thickened endometrium and possible cyst.  Patient also follows with [facility] for pancreatic screening due to family history of pancreatic cancer. Patient presents to PST today for planned hysteroscopy dilation and curettage on April 14, 2023 with Dr. Carrera.          Plan:  1. PST instructions given ; NPO status/  instructions to be given by ASU   2. Pt instructed to take following meds on day of surgery: synthroid  3. Pt instructed to take routine evening medications unless indicated   4. Stop NSAIDS ( Aspirin Alev Motrin Mobic Diclofenac), herbal supplements , MVI , Vitamin fish oil 7 days prior to surgery  unless   directed by surgeon or cardiologist;   5. Medical Optimization  with Dr. Massey  7. Labs EKG as per surgeon request   10. Pt denies covid symptoms shortness of breath fever cough    58 year old female with strong family history of breast cancer, on tamoxifen, found to have thickened endometrium and possible cyst.  Patient also follows with [facility] for pancreatic screening due to family history of pancreatic cancer. Patient presents to PST today for planned hysteroscopy dilation and curettage on April 14, 2023 with Dr. Carrera.          Plan:  1. PST instructions given, education provided ; NPO status/  instructions to be given by ASU   2. Pt instructed to take following meds on day of surgery: synthroid  3. Pt instructed to take routine evening medications unless indicated   4. Stop NSAIDS ( Aspirin Alev Motrin Mobic Diclofenac), herbal supplements , MVI , Vitamin fish oil 7 days prior to surgery  unless   directed by surgeon or cardiologist;   5. Medical Optimization  with Dr. Massey  6. Labs EKG as per surgeon request   7. Pt denies covid symptoms shortness of breath fever cough

## 2023-04-08 DIAGNOSIS — Z01.818 ENCOUNTER FOR OTHER PREPROCEDURAL EXAMINATION: ICD-10-CM

## 2023-04-08 DIAGNOSIS — R93.89 ABNORMAL FINDINGS ON DIAGNOSTIC IMAGING OF OTHER SPECIFIED BODY STRUCTURES: ICD-10-CM

## 2023-04-10 ENCOUNTER — APPOINTMENT (OUTPATIENT)
Dept: FAMILY MEDICINE | Facility: CLINIC | Age: 58
End: 2023-04-10
Payer: COMMERCIAL

## 2023-04-10 VITALS
SYSTOLIC BLOOD PRESSURE: 120 MMHG | WEIGHT: 208 LBS | OXYGEN SATURATION: 98 % | TEMPERATURE: 96.3 F | HEART RATE: 74 BPM | DIASTOLIC BLOOD PRESSURE: 80 MMHG | BODY MASS INDEX: 33.43 KG/M2 | HEIGHT: 66 IN

## 2023-04-10 DIAGNOSIS — R73.01 IMPAIRED FASTING GLUCOSE: ICD-10-CM

## 2023-04-10 DIAGNOSIS — Z80.0 FAMILY HISTORY OF MALIGNANT NEOPLASM OF DIGESTIVE ORGANS: ICD-10-CM

## 2023-04-10 DIAGNOSIS — Z00.00 ENCOUNTER FOR GENERAL ADULT MEDICAL EXAMINATION W/OUT ABNORMAL FINDINGS: ICD-10-CM

## 2023-04-10 DIAGNOSIS — R93.89 ABNORMAL FINDINGS ON DIAGNOSTIC IMAGING OF OTHER SPECIFIED BODY STRUCTURES: ICD-10-CM

## 2023-04-10 PROCEDURE — 99396 PREV VISIT EST AGE 40-64: CPT | Mod: 25

## 2023-04-10 PROCEDURE — 36415 COLL VENOUS BLD VENIPUNCTURE: CPT

## 2023-04-10 RX ORDER — CALCIUM CARBONATE 260MG(650)
TABLET,CHEWABLE ORAL
Refills: 0 | Status: ACTIVE | COMMUNITY

## 2023-04-10 NOTE — HEALTH RISK ASSESSMENT
[Yes] : Yes [4 or more  times a week (4 pts)] : 4 or more  times a week (4 points) [1 or 2 (0 pts)] : 1 or 2 (0 points) [Never (0 pts)] : Never (0 points) [No] : In the past 12 months have you used drugs other than those required for medical reasons? No [No falls in past year] : Patient reported no falls in the past year [0] : 2) Feeling down, depressed, or hopeless: Not at all (0) [PHQ-2 Negative - No further assessment needed] : PHQ-2 Negative - No further assessment needed [HIV test declined] : HIV test declined [Hepatitis C test declined] : Hepatitis C test declined [None] : None [With Family] : lives with family [Employed] : employed [] :  [# Of Children ___] : has [unfilled] children [Sexually Active] : sexually active [Feels Safe at Home] : Feels safe at home [Fully functional (bathing, dressing, toileting, transferring, walking, feeding)] : Fully functional (bathing, dressing, toileting, transferring, walking, feeding) [Fully functional (using the telephone, shopping, preparing meals, housekeeping, doing laundry, using] : Fully functional and needs no help or supervision to perform IADLs (using the telephone, shopping, preparing meals, housekeeping, doing laundry, using transportation, managing medications and managing finances) [Smoke Detector] : smoke detector [Carbon Monoxide Detector] : carbon monoxide detector [Safety elements used in home] : safety elements used in home [Seat Belt] :  uses seat belt [Sunscreen] : uses sunscreen [With Patient/Caregiver] : , with patient/caregiver [Reviewed no changes] : Reviewed, no changes [I will adhere to the patient's wishes.] : I will adhere to the patient's wishes. [Never] : Never [Patient reported mammogram was normal] : Patient reported mammogram was normal [Patient reported PAP Smear was normal] : Patient reported PAP Smear was normal [Patient reported bone density results were normal] : Patient reported bone density results were normal [Patient reported colonoscopy was abnormal] : Patient reported colonoscopy was abnormal [Audit-CScore] : 4 [de-identified] : Pilates, walking [de-identified] : Weight Watchers; well balanced, improving [[id number]] : 0 [EyeExamDate] : 2020 [Change in mental status noted] : No change in mental status noted [Language] : denies difficulty with language [High Risk Behavior] : no high risk behavior [Reports changes in hearing] : Reports no changes in hearing [Reports changes in vision] : Reports no changes in vision [Reports changes in dental health] : Reports no changes in dental health [Travel to Developing Areas] : does not  travel to developing areas [MammogramDate] : 02/2023 [PapSmearDate] : 01/2023 [BoneDensityDate] : 02/2023 [ColonoscopyDate] : 08/2020 [ColonoscopyComments] : internal hemorrhoids, moderate diverticulosis; repeat 2025 [FreeTextEntry2] : [facility]

## 2023-04-10 NOTE — ASSESSMENT
[FreeTextEntry1] : Patient is a 59yo female with PMH HLD, hypothyroidism, IFG, anxiety, breast cancer who presents to the office for CPE and MCA. \par \par Health Maintenance\par - Due for Shingrix, pt will consider in near future after procedure, plans to check with insurance where it is covered.\par - Fasting labs drawn in office.\par - Eat plenty of fruits and vegetables, especially deeply colored fruits/vegetables (such as leafy greens, peaches) that are more nutrient-dense.  Continue to work hard on diet and exercise, limiting/avoiding saturated fat, fatty foods, greasy foods, red meats, white flour-based carbohydrates (cookies, cakes, white bread, white rice), and added sugars.  Chose whole grain foods and products made with whole grains over refined grains and white flour-based carbohydrates.  Avoid beverages and food with added sugar.  Limit salt intake to improve blood pressure.  Limit alcohol intake.\par - Try and incorporate 30 minutes of aerobic exercise to your daily routine.\par \par Hypothyroidism\par - Labs drawn in office.\par - Continue Levothyroxine 125mcg daily.\par \par Continue follow up with all specialists (Oncology, Pancreatic Cancer specialist).\par \par Thickened Endometrium\par - Preop labs reviewed, b/w performed 4/7/23 WNL, UA dirty, no signs/symptoms UTI.\par - EKG incomplete RBBB, stable/unchanged when compared to prior from 04/2022.\par - Pt optimized for D&C.\par \par Call the office or go to the ED immediately if you develop new, worsening or concerning symptoms including high fever, severe headache/worst headache of your life, confusion, dizziness/lightheadedness, loss of consciousness, severe chest pain, difficulty breathing, shortness of breath, severe abdominal pain, excessive vomiting/diarrhea, inability to feel/move the extremities, or any other concerning symptoms.\par

## 2023-04-10 NOTE — ADDENDUM
[FreeTextEntry1] : Agree with above. Patient is medically optimized for the proposed procedure.\carmencita Massey M.D.

## 2023-04-10 NOTE — HISTORY OF PRESENT ILLNESS
[FreeTextEntry1] : CPE. [de-identified] : Patient is a 59yo female with PMH HLD, hypothyroidism, IFG, anxiety, breast cancer who presents to the office for CPE and MCA. \par  \par Last CPE:  04/07/2022, Dr. ANDRE Massey. \par GYN Exam:  [facility], Dr. Carrera.  Scheduled for D&C 4/14/23.  Pap 01/2023 normal. \par Mammogram:  02/2023, [facility], follows with Dr. Crawford; hx breast cancer, on Tamoxifen 20mg every other day. \par DEXA:  02/2023, normal. \par Colonoscopy:  08/2020, Dr. Ellis; internal hemorrhoids, moderate diverticulosis; repeat 5 years. \par Ophthalmology:  Dr. Cote, last visit about 3 years ago.\par Dermatology:  Dr. Greco's replacement, 10/2022.\par Dentist:  [facility].\par Shingles:  due, will check with insurance company.\par Flu:  fall 2022.\par Tdap:  09/25/2019. \par COVID:  x3.\par \par Pt scheduled for D&C with Dr. Carrera on 4/14/2023 at [facility] for thickened endometrium, polyp.  Pt had PST done on 4/4/23 at  and results reviewed through [facility].  Labs normal.  UA small blood, 3-5 RBC, many epithelial/bacterial, likely dirty specimen. Pt denies any urinary complaints.\par EKG incomplete RBBB, nonspecific T wave abnormality, possible left atrial enlargement.  No significant change when compared to prior in chart from 04/2022.\par \par Oncology:  Dr. Izquierdo. \par Pt follows with [facility] ([facility]), Dr. Dodson, for strong family history of pancreatic cancer (mother, sister, maternal uncle). \par \par Pt had 10lb weight gain after taking Exemastane for breast cancer, has not been on for the past 6+ months but struggling to lose weight.  Pt is planning to re-start Weight Watchers, increase exercise to help lose weight.\par \par Pt takes Xanax 0.25mg approximately once every 2 weeks.  Pt requesting RF today.  ISTOP confirmed, last RF 10/10/22 by our office, reference number [id number].

## 2023-04-11 LAB
ALBUMIN SERPL ELPH-MCNC: 4.5 G/DL
ALP BLD-CCNC: 55 U/L
ALT SERPL-CCNC: 30 U/L
ANION GAP SERPL CALC-SCNC: 12 MMOL/L
AST SERPL-CCNC: 23 U/L
BILIRUB SERPL-MCNC: 0.4 MG/DL
BUN SERPL-MCNC: 11 MG/DL
CALCIUM SERPL-MCNC: 9.4 MG/DL
CHLORIDE SERPL-SCNC: 105 MMOL/L
CHOLEST SERPL-MCNC: 228 MG/DL
CO2 SERPL-SCNC: 25 MMOL/L
CREAT SERPL-MCNC: 0.68 MG/DL
EGFR: 100 ML/MIN/1.73M2
GLUCOSE SERPL-MCNC: 106 MG/DL
HDLC SERPL-MCNC: 68 MG/DL
LDLC SERPL CALC-MCNC: 141 MG/DL
MAGNESIUM SERPL-MCNC: 2 MG/DL
NONHDLC SERPL-MCNC: 160 MG/DL
POTASSIUM SERPL-SCNC: 5 MMOL/L
PROT SERPL-MCNC: 6.5 G/DL
SODIUM SERPL-SCNC: 142 MMOL/L
T4 FREE SERPL-MCNC: 1.4 NG/DL
TRIGL SERPL-MCNC: 94 MG/DL
TSH SERPL-ACNC: 4.8 UIU/ML

## 2023-04-12 ENCOUNTER — APPOINTMENT (OUTPATIENT)
Dept: OBGYN | Facility: CLINIC | Age: 58
End: 2023-04-12
Payer: COMMERCIAL

## 2023-04-12 VITALS
TEMPERATURE: 96.6 F | HEIGHT: 66 IN | SYSTOLIC BLOOD PRESSURE: 124 MMHG | HEART RATE: 78 BPM | WEIGHT: 208 LBS | BODY MASS INDEX: 33.43 KG/M2 | DIASTOLIC BLOOD PRESSURE: 88 MMHG

## 2023-04-12 PROBLEM — R06.83 SNORING: Chronic | Status: ACTIVE | Noted: 2023-04-07
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
## 2023-04-12 PROBLEM — R93.89 ABNORMAL FINDINGS ON DIAGNOSTIC IMAGING OF OTHER SPECIFIED BODY STRUCTURES: Chronic | Status: ACTIVE | Noted: 2023-04-07

## 2023-04-12 PROBLEM — D25.9 LEIOMYOMA OF UTERUS, UNSPECIFIED: Chronic | Status: ACTIVE | Noted: 2023-04-07

## 2023-04-12 LAB — HEMOGLOBIN: 13

## 2023-04-12 PROCEDURE — 99214 OFFICE O/P EST MOD 30 MIN: CPT | Mod: 57

## 2023-04-12 PROCEDURE — 85018 HEMOGLOBIN: CPT | Mod: QW

## 2023-04-12 RX ORDER — LEVOTHYROXINE SODIUM 0.12 MG/1
125 TABLET ORAL DAILY
Qty: 90 | Refills: 1 | Status: DISCONTINUED | COMMUNITY
Start: 2022-01-10 | End: 2023-04-12

## 2023-04-14 ENCOUNTER — TRANSCRIPTION ENCOUNTER (OUTPATIENT)
Age: 58
End: 2023-04-14

## 2023-04-14 ENCOUNTER — APPOINTMENT (OUTPATIENT)
Dept: OBGYN | Facility: HOSPITAL | Age: 58
End: 2023-04-14

## 2023-04-14 ENCOUNTER — RESULT REVIEW (OUTPATIENT)
Age: 58
End: 2023-04-14

## 2023-04-14 ENCOUNTER — OUTPATIENT (OUTPATIENT)
Dept: INPATIENT UNIT | Facility: HOSPITAL | Age: 58
LOS: 1 days | Discharge: HOME | End: 2023-04-14
Payer: COMMERCIAL

## 2023-04-14 VITALS
WEIGHT: 209.44 LBS | SYSTOLIC BLOOD PRESSURE: 124 MMHG | HEART RATE: 84 BPM | RESPIRATION RATE: 15 BRPM | HEIGHT: 65 IN | TEMPERATURE: 98 F | DIASTOLIC BLOOD PRESSURE: 76 MMHG | OXYGEN SATURATION: 97 %

## 2023-04-14 VITALS
RESPIRATION RATE: 16 BRPM | DIASTOLIC BLOOD PRESSURE: 65 MMHG | SYSTOLIC BLOOD PRESSURE: 119 MMHG | OXYGEN SATURATION: 99 % | TEMPERATURE: 98 F | HEART RATE: 73 BPM

## 2023-04-14 DIAGNOSIS — Z98.891 HISTORY OF UTERINE SCAR FROM PREVIOUS SURGERY: Chronic | ICD-10-CM

## 2023-04-14 DIAGNOSIS — Z98.890 OTHER SPECIFIED POSTPROCEDURAL STATES: Chronic | ICD-10-CM

## 2023-04-14 DIAGNOSIS — Z90.89 ACQUIRED ABSENCE OF OTHER ORGANS: Chronic | ICD-10-CM

## 2023-04-14 DIAGNOSIS — R93.89 ABNORMAL FINDINGS ON DIAGNOSTIC IMAGING OF OTHER SPECIFIED BODY STRUCTURES: ICD-10-CM

## 2023-04-14 DIAGNOSIS — K08.409 PARTIAL LOSS OF TEETH, UNSPECIFIED CAUSE, UNSPECIFIED CLASS: Chronic | ICD-10-CM

## 2023-04-14 PROCEDURE — 88305 TISSUE EXAM BY PATHOLOGIST: CPT | Mod: 26

## 2023-04-14 PROCEDURE — 58558 HYSTEROSCOPY BIOPSY: CPT

## 2023-04-14 PROCEDURE — 88305 TISSUE EXAM BY PATHOLOGIST: CPT

## 2023-04-14 RX ORDER — ONDANSETRON 8 MG/1
4 TABLET, FILM COATED ORAL ONCE
Refills: 0 | Status: DISCONTINUED | OUTPATIENT
Start: 2023-04-14 | End: 2023-04-14

## 2023-04-14 RX ORDER — OXYCODONE HYDROCHLORIDE 5 MG/1
10 TABLET ORAL ONCE
Refills: 0 | Status: DISCONTINUED | OUTPATIENT
Start: 2023-04-14 | End: 2023-04-14

## 2023-04-14 RX ORDER — ASPIRIN/CALCIUM CARB/MAGNESIUM 324 MG
1 TABLET ORAL
Refills: 0 | DISCHARGE

## 2023-04-14 RX ORDER — SODIUM CHLORIDE 9 MG/ML
1000 INJECTION, SOLUTION INTRAVENOUS
Refills: 0 | Status: DISCONTINUED | OUTPATIENT
Start: 2023-04-14 | End: 2023-04-14

## 2023-04-14 RX ORDER — ONDANSETRON 8 MG/1
4 TABLET, FILM COATED ORAL EVERY 6 HOURS
Refills: 0 | Status: DISCONTINUED | OUTPATIENT
Start: 2023-04-14 | End: 2023-04-14

## 2023-04-14 RX ORDER — OXYCODONE AND ACETAMINOPHEN 5; 325 MG/1; MG/1
1 TABLET ORAL EVERY 4 HOURS
Refills: 0 | Status: DISCONTINUED | OUTPATIENT
Start: 2023-04-14 | End: 2023-04-14

## 2023-04-14 RX ORDER — SOLIFENACIN SUCCINATE 10 MG/1
1 TABLET ORAL
Qty: 0 | Refills: 0 | DISCHARGE

## 2023-04-14 RX ORDER — HEPARIN SODIUM 5000 [USP'U]/ML
5000 INJECTION INTRAVENOUS; SUBCUTANEOUS ONCE
Refills: 0 | Status: COMPLETED | OUTPATIENT
Start: 2023-04-14 | End: 2023-04-14

## 2023-04-14 RX ORDER — LEVOTHYROXINE SODIUM 125 MCG
1 TABLET ORAL
Qty: 0 | Refills: 0 | DISCHARGE

## 2023-04-14 RX ORDER — OXYCODONE AND ACETAMINOPHEN 5; 325 MG/1; MG/1
2 TABLET ORAL EVERY 6 HOURS
Refills: 0 | Status: DISCONTINUED | OUTPATIENT
Start: 2023-04-14 | End: 2023-04-14

## 2023-04-14 RX ORDER — GABAPENTIN 400 MG/1
600 CAPSULE ORAL ONCE
Refills: 0 | Status: COMPLETED | OUTPATIENT
Start: 2023-04-14 | End: 2023-04-14

## 2023-04-14 RX ORDER — ASPIRIN/CALCIUM CARB/MAGNESIUM 324 MG
1 TABLET ORAL
Qty: 0 | Refills: 0 | DISCHARGE

## 2023-04-14 RX ORDER — CHOLECALCIFEROL (VITAMIN D3) 125 MCG
1 CAPSULE ORAL
Qty: 0 | Refills: 0 | DISCHARGE

## 2023-04-14 RX ORDER — FENTANYL CITRATE 50 UG/ML
25 INJECTION INTRAVENOUS
Refills: 0 | Status: DISCONTINUED | OUTPATIENT
Start: 2023-04-14 | End: 2023-04-14

## 2023-04-14 RX ORDER — MONTELUKAST 4 MG/1
1 TABLET, CHEWABLE ORAL
Qty: 0 | Refills: 0 | DISCHARGE

## 2023-04-14 RX ORDER — CELECOXIB 200 MG/1
400 CAPSULE ORAL ONCE
Refills: 0 | Status: COMPLETED | OUTPATIENT
Start: 2023-04-14 | End: 2023-04-14

## 2023-04-14 RX ORDER — OMEGA-3 ACID ETHYL ESTERS 1 G
0 CAPSULE ORAL
Refills: 0 | DISCHARGE

## 2023-04-14 RX ORDER — MONTELUKAST 4 MG/1
1 TABLET, CHEWABLE ORAL
Refills: 0 | DISCHARGE

## 2023-04-14 RX ORDER — TAMOXIFEN CITRATE 20 MG/1
1 TABLET, FILM COATED ORAL
Refills: 0 | DISCHARGE

## 2023-04-14 RX ORDER — ASCORBIC ACID 60 MG
1 TABLET,CHEWABLE ORAL
Refills: 0 | DISCHARGE

## 2023-04-14 RX ORDER — ERGOCALCIFEROL 1.25 MG/1
0 CAPSULE ORAL
Refills: 0 | DISCHARGE

## 2023-04-14 RX ORDER — LEVOTHYROXINE SODIUM 125 MCG
1 TABLET ORAL
Refills: 0 | DISCHARGE

## 2023-04-14 RX ORDER — SOLIFENACIN SUCCINATE 10 MG/1
1 TABLET ORAL
Refills: 0 | DISCHARGE

## 2023-04-14 RX ORDER — ACETAMINOPHEN 500 MG
1000 TABLET ORAL ONCE
Refills: 0 | Status: COMPLETED | OUTPATIENT
Start: 2023-04-14 | End: 2023-04-14

## 2023-04-14 RX ADMIN — Medication 1000 MILLIGRAM(S): at 12:19

## 2023-04-14 RX ADMIN — Medication 1000 MILLIGRAM(S): at 12:18

## 2023-04-14 NOTE — BRIEF OPERATIVE NOTE - NSICDXBRIEFPOSTOP_GEN_ALL_CORE_FT
POST-OP DIAGNOSIS:  Post-menopausal 14-Apr-2023 12:50:41  Tomasz Carrera  Increased endometrial stripe 14-Apr-2023 12:50:28  Tomasz Carrera

## 2023-04-14 NOTE — ASU DISCHARGE PLAN (ADULT/PEDIATRIC) - CALL YOUR DOCTOR IF YOU HAVE ANY OF THE FOLLOWING:
heavy vaginal bleeding/Pain not relieved by Medications/Fever greater than (need to indicate Fahrenheit or Celsius)/Nausea and vomiting that does not stop/Unable to urinate
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
## 2023-04-14 NOTE — ASU PATIENT PROFILE, ADULT - FALL HARM RISK - UNIVERSAL INTERVENTIONS
Bed in lowest position, wheels locked, appropriate side rails in place/Call bell, personal items and telephone in reach/Instruct patient to call for assistance before getting out of bed or chair/Non-slip footwear when patient is out of bed/[place] to call system/Physically safe environment - no spills, clutter or unnecessary equipment/Purposeful Proactive Rounding/Room/bathroom lighting operational, light cord in reach

## 2023-04-14 NOTE — BRIEF OPERATIVE NOTE - NSICDXBRIEFPREOP_GEN_ALL_CORE_FT
PRE-OP DIAGNOSIS:  Increased endometrial stripe 14-Apr-2023 12:50:10  Tomasz Carrera  Post-menopausal 14-Apr-2023 12:50:17  Tomasz Carrera

## 2023-04-14 NOTE — BRIEF OPERATIVE NOTE - OPERATION/FINDINGS
endometrial polyp; symmetric cavity; normal ostia symmetric cavity; ostia scarred over; normal endocervical canal

## 2023-04-14 NOTE — ASU DISCHARGE PLAN (ADULT/PEDIATRIC) - NS MD DC FALL RISK RISK
For information on Fall & Injury Prevention, visit: [email or web address] OR  [email or web address] OR  https://www.cdc.gov/steadi/patient.html

## 2023-04-14 NOTE — BRIEF OPERATIVE NOTE - NSICDXBRIEFPROCEDURE_GEN_ALL_CORE_FT
PROCEDURES:  Exam under anesthesia, gynecologic 14-Apr-2023 12:48:51  Tomasz Carrera  Diagnostic hysteroscopy 14-Apr-2023 12:48:59  Tomasz Carrera  Dilation and curettage, uterus, fractional 14-Apr-2023 12:49:06  Tomasz Carrera  Hysteroscopic polypectomy using MyoSure tissue removal system 14-Apr-2023 12:49:12  Tomasz Carrera   PROCEDURES:  Exam under anesthesia, gynecologic 14-Apr-2023 12:48:51  Tomasz Carrera  Diagnostic hysteroscopy 14-Apr-2023 12:48:59  Tomasz Carrera  Dilation and curettage, uterus, fractional 14-Apr-2023 12:49:06  Tomasz Carrera

## 2023-04-14 NOTE — ASU DISCHARGE PLAN (ADULT/PEDIATRIC) - CARE PROVIDER_API CALL
Tomasz Carrera)  Obstetrics and Gynecology  [address] [place] [address]  Phone: [phone]  Fax: [phone]  Follow Up Time: 2 weeks

## 2023-04-17 NOTE — HISTORY OF PRESENT ILLNESS
[FreeTextEntry1] : Patient here for final decision for surgery.\par Plan for D & C possible myomectomy and or polypectomy.

## 2023-04-17 NOTE — PHYSICAL EXAM
[Chaperone Present] : A chaperone was present in the examining room during all aspects of the physical examination [Appropriately responsive] : appropriately responsive [Alert] : alert [No Acute Distress] : no acute distress [No Lymphadenopathy] : no lymphadenopathy [Soft] : soft [Non-tender] : non-tender [Non-distended] : non-distended [No HSM] : No HSM [No Lesions] : no lesions [No Mass] : no mass [Oriented x3] : oriented x3 [Labia Majora] : normal [Labia Minora] : normal [Normal] : normal [Anteversion] : anteverted [Uterine Adnexae] : normal [FreeTextEntry1] : [facility]

## 2023-04-17 NOTE — DISCUSSION/SUMMARY
[FreeTextEntry1] : Discussed pre op and post op instructions in detail.\par Vaginal restrictions post op only\par all of patients questions regarding procedure were answered.\par consent signed by MD, patient and witness.\par she is to f/u with me 2 weeks post operatively. she is agreeable with plan.\par \par \par \par I Aurelia BERRIOS am scribing for the presence of Dr. Carrera the following sections HISTORY OF PRESENT ILLNESS, PAST MEDICAL/FAMILY/SOCIAL HISTORY; REVIEW OF SYSTEMS; VITAL SIGNS; PHYSICAL EXAM; DISPOSITION. \par \par \par I personally performed the services described in the documentation, reviewed the documentation recorded by the scribe in my presence and it accurately and completely records my words and actions.\par

## 2023-04-19 ENCOUNTER — NON-APPOINTMENT (OUTPATIENT)
Age: 58
End: 2023-04-19

## 2023-04-19 LAB — SURGICAL PATHOLOGY STUDY: SIGNIFICANT CHANGE UP

## 2023-04-20 DIAGNOSIS — E03.9 HYPOTHYROIDISM, UNSPECIFIED: ICD-10-CM

## 2023-04-20 DIAGNOSIS — Z91.040 LATEX ALLERGY STATUS: ICD-10-CM

## 2023-04-20 DIAGNOSIS — F41.9 ANXIETY DISORDER, UNSPECIFIED: ICD-10-CM

## 2023-04-20 DIAGNOSIS — R93.89 ABNORMAL FINDINGS ON DIAGNOSTIC IMAGING OF OTHER SPECIFIED BODY STRUCTURES: ICD-10-CM

## 2023-04-20 DIAGNOSIS — Z79.810 LONG TERM (CURRENT) USE OF SELECTIVE ESTROGEN RECEPTOR MODULATORS (SERMS): ICD-10-CM

## 2023-04-20 DIAGNOSIS — N85.4 MALPOSITION OF UTERUS: ICD-10-CM

## 2023-04-20 DIAGNOSIS — Z79.82 LONG TERM (CURRENT) USE OF ASPIRIN: ICD-10-CM

## 2023-04-20 DIAGNOSIS — N87.9 DYSPLASIA OF CERVIX UTERI, UNSPECIFIED: ICD-10-CM

## 2023-04-20 DIAGNOSIS — E78.5 HYPERLIPIDEMIA, UNSPECIFIED: ICD-10-CM

## 2023-04-20 DIAGNOSIS — Z85.3 PERSONAL HISTORY OF MALIGNANT NEOPLASM OF BREAST: ICD-10-CM

## 2023-05-09 ENCOUNTER — APPOINTMENT (OUTPATIENT)
Dept: OBGYN | Facility: CLINIC | Age: 58
End: 2023-05-09
Payer: COMMERCIAL

## 2023-05-09 VITALS — SYSTOLIC BLOOD PRESSURE: 158 MMHG | DIASTOLIC BLOOD PRESSURE: 92 MMHG | HEART RATE: 69 BPM

## 2023-05-09 LAB — HEMOGLOBIN: 12.4

## 2023-05-09 PROCEDURE — 99212 OFFICE O/P EST SF 10 MIN: CPT

## 2023-05-09 PROCEDURE — 85018 HEMOGLOBIN: CPT | Mod: QW

## 2023-05-09 NOTE — HISTORY OF PRESENT ILLNESS
[Pain is well-controlled] : pain is well-controlled [Pathology reviewed] : pathology reviewed [Fever] : no fever [Chills] : no chills [Nausea] : no nausea [Vomiting] : no vomiting [Diarrhea] : no diarrhea [Vaginal Bleeding] : no vaginal bleeding [Pelvic Pressure] : no pelvic pressure [Dysuria] : no dysuria [Vaginal Discharge] : no vaginal discharge [Constipation] : no constipation [de-identified] : s/p D & C  4/14/2023

## 2023-05-09 NOTE — PLAN
[FreeTextEntry1] : No restrictions at this time. \par Advised bleeding may occur intermittently but should be resolved by 6 weeks post op\par Pt to call with heavy bleeding or pain not controlled with NSAIDs\par Pathology and Surgical photos reviewed today\par Pt to f/u for her annual in 01/2024\par Supportive care measure discussed. \par All questions answered, pt agreeable with plan. \par \par \par I Aurelia BERRIOS am scribing for the presence of Dr. Carrera the following sections HISTORY OF PRESENT ILLNESS, PAST MEDICAL/FAMILY/SOCIAL HISTORY; REVIEW OF SYSTEMS; VITAL SIGNS; PHYSICAL EXAM; DISPOSITION. \par \par \par I personally performed the services described in the documentation, reviewed the documentation recorded by the scribe in my presence and it accurately and completely records my words and actions.\par

## 2023-05-17 ENCOUNTER — RX RENEWAL (OUTPATIENT)
Age: 58
End: 2023-05-17

## 2023-05-17 RX ORDER — MONTELUKAST 10 MG/1
10 TABLET, FILM COATED ORAL
Qty: 90 | Refills: 3 | Status: ACTIVE | COMMUNITY
Start: 2023-05-17 | End: 1900-01-01

## 2023-05-22 ENCOUNTER — APPOINTMENT (OUTPATIENT)
Dept: FAMILY MEDICINE | Facility: CLINIC | Age: 58
End: 2023-05-22
Payer: COMMERCIAL

## 2023-05-22 PROCEDURE — 36415 COLL VENOUS BLD VENIPUNCTURE: CPT

## 2023-05-24 LAB
T4 FREE SERPL-MCNC: 1.6 NG/DL
TSH SERPL-ACNC: 1.3 UIU/ML

## 2023-05-26 ENCOUNTER — NON-APPOINTMENT (OUTPATIENT)
Age: 58
End: 2023-05-26

## 2023-06-05 ENCOUNTER — APPOINTMENT (OUTPATIENT)
Dept: FAMILY MEDICINE | Facility: CLINIC | Age: 58
End: 2023-06-05
Payer: COMMERCIAL

## 2023-06-05 VITALS
SYSTOLIC BLOOD PRESSURE: 128 MMHG | DIASTOLIC BLOOD PRESSURE: 82 MMHG | TEMPERATURE: 97.2 F | WEIGHT: 208 LBS | BODY MASS INDEX: 33.43 KG/M2 | HEIGHT: 66 IN | OXYGEN SATURATION: 98 % | HEART RATE: 83 BPM

## 2023-06-05 PROCEDURE — 99214 OFFICE O/P EST MOD 30 MIN: CPT

## 2023-06-05 NOTE — HISTORY OF PRESENT ILLNESS
[FreeTextEntry1] : Follow up. [de-identified] : Patient is a 57yo female with PMH HLD, hypothyroidism, IFG, anxiety, breast cancer who presents to the office for follow up. \par \par Pt interested in weight loss assistance.  Pt has a friend who is on Contrave and she would like to try it.  Efforts with diet and exercise have not helped pt lose weight.  Pt states she has increased walking, started Pilates without improvement.  Pt is not currently interested in Ozempic or other medications like Ozempic because of her hx hypothyroidism and she has a family history of pancreatic cancer.

## 2023-06-05 NOTE — PHYSICAL EXAM
[Normal Outer Ear/Nose] : the outer ears and nose were normal in appearance [No JVD] : no jugular venous distention [No Respiratory Distress] : no respiratory distress  [No Edema] : there was no peripheral edema [Normal] : no rash [Coordination Grossly Intact] : coordination grossly intact [No Focal Deficits] : no focal deficits [Normal Gait] : normal gait [Normal Affect] : the affect was normal [Normal Insight/Judgement] : insight and judgment were intact [de-identified] : obese

## 2023-06-05 NOTE — ASSESSMENT
[FreeTextEntry1] : Patient is a 59yo female with PMH HLD, hypothyroidism, IFG, anxiety, breast cancer, obesity who presents to the office for follow up. \par \par Will prescribe Contrave.  Contrave 8-90mg 1 tablet qAM x1 week, then 1 tablet BID x1 week, then 2 tablets AM 1 tablet PM x1 week, then 2 tablets BID.\par R/B/SE/A discussed.\par Encouraged diet/exercise as well.\par Will f/u in 3 months for weight check and repeat labs.\par \par Call the office or go to the ED immediately if you develop new, worsening or concerning symptoms including high fever, severe headache/worst headache of your life, confusion, dizziness/lightheadedness, loss of consciousness, severe chest pain, difficulty breathing, shortness of breath, severe abdominal pain, excessive vomiting/diarrhea, inability to feel/move the extremities, or any other concerning symptoms.\par

## 2023-06-08 ENCOUNTER — RX CHANGE (OUTPATIENT)
Age: 58
End: 2023-06-08

## 2023-07-11 ENCOUNTER — APPOINTMENT (OUTPATIENT)
Dept: FAMILY MEDICINE | Facility: CLINIC | Age: 58
End: 2023-07-11
Payer: COMMERCIAL

## 2023-07-11 VITALS
BODY MASS INDEX: 33.59 KG/M2 | SYSTOLIC BLOOD PRESSURE: 130 MMHG | HEART RATE: 85 BPM | DIASTOLIC BLOOD PRESSURE: 82 MMHG | WEIGHT: 209 LBS | OXYGEN SATURATION: 97 % | TEMPERATURE: 97.1 F | HEIGHT: 66 IN

## 2023-07-11 DIAGNOSIS — E66.9 OBESITY, UNSPECIFIED: ICD-10-CM

## 2023-07-11 DIAGNOSIS — R73.03 PREDIABETES.: ICD-10-CM

## 2023-07-11 DIAGNOSIS — E78.5 HYPERLIPIDEMIA, UNSPECIFIED: ICD-10-CM

## 2023-07-11 DIAGNOSIS — E03.9 HYPOTHYROIDISM, UNSPECIFIED: ICD-10-CM

## 2023-07-11 PROCEDURE — 99214 OFFICE O/P EST MOD 30 MIN: CPT | Mod: 25

## 2023-07-11 PROCEDURE — 36415 COLL VENOUS BLD VENIPUNCTURE: CPT

## 2023-07-11 NOTE — HISTORY OF PRESENT ILLNESS
[FreeTextEntry1] : Follow up. [de-identified] : Patient is a 59yo female with PMH HLD, hypothyroidism, IFG, anxiety, breast cancer who presents to the office for follow up. \par \par Pt seen at our office 6/5/23 requesting weight loss assistance.  Has tried diet/exercise without success.  Has multiple comorbidities (HLD, IFG).  Pt not interested in GLP-1 agonists like Ozempic because of personal history of hypothyroidism as well as family history of pancreatic cancer.  Pt states Contrave was not covered by insurance, but got a supply of 30 days and just started yesterday.\par \par Pt states she has started the [employer] Diet and has lost 7lbs.\par \par , total cholesterol 228, ASCVD risk 2.79%, fasting glucose 106 in 04/2023.\par \par Currently taking Levothyroxine 137mcg daily, increased from 125mcg daily in 04/2023.  Pt had repeat labs in 05/2023 with normalization of TFTs.

## 2023-07-11 NOTE — HEALTH RISK ASSESSMENT
[Yes] : Yes [4 or more  times a week (4 pts)] : 4 or more  times a week (4 points) [1 or 2 (0 pts)] : 1 or 2 (0 points) [Never (0 pts)] : Never (0 points) [No] : In the past 12 months have you used drugs other than those required for medical reasons? No [No falls in past year] : Patient reported no falls in the past year [0] : 2) Feeling down, depressed, or hopeless: Not at all (0) [PHQ-2 Negative - No further assessment needed] : PHQ-2 Negative - No further assessment needed [Audit-CScore] : 4 [de-identified] : has increased exercise, Pilates, walking [de-identified] : well balanced, on [place] Diet [FMR2Omudu] : 0 [Never] : Never

## 2023-07-12 ENCOUNTER — NON-APPOINTMENT (OUTPATIENT)
Age: 58
End: 2023-07-12

## 2023-07-12 LAB
ALBUMIN SERPL ELPH-MCNC: 4.5 G/DL
ALP BLD-CCNC: 49 U/L
ALT SERPL-CCNC: 40 U/L
ANION GAP SERPL CALC-SCNC: 12 MMOL/L
AST SERPL-CCNC: 29 U/L
BILIRUB SERPL-MCNC: 0.5 MG/DL
BUN SERPL-MCNC: 18 MG/DL
CALCIUM SERPL-MCNC: 9.7 MG/DL
CHLORIDE SERPL-SCNC: 107 MMOL/L
CHOLEST SERPL-MCNC: 215 MG/DL
CO2 SERPL-SCNC: 26 MMOL/L
CREAT SERPL-MCNC: 0.73 MG/DL
EGFR: 94 ML/MIN/1.73M2
ESTIMATED AVERAGE GLUCOSE: 111 MG/DL
GLUCOSE SERPL-MCNC: 107 MG/DL
HBA1C MFR BLD HPLC: 5.5 %
HDLC SERPL-MCNC: 60 MG/DL
LDLC SERPL CALC-MCNC: 137 MG/DL
NONHDLC SERPL-MCNC: 156 MG/DL
POTASSIUM SERPL-SCNC: 4.4 MMOL/L
PROT SERPL-MCNC: 6.5 G/DL
SODIUM SERPL-SCNC: 144 MMOL/L
T4 FREE SERPL-MCNC: 1.5 NG/DL
TRIGL SERPL-MCNC: 103 MG/DL
TSH SERPL-ACNC: 1.15 UIU/ML

## 2023-07-14 ENCOUNTER — FORM ENCOUNTER (OUTPATIENT)
Age: 58
End: 2023-07-14

## 2023-07-17 ENCOUNTER — RX RENEWAL (OUTPATIENT)
Age: 58
End: 2023-07-17

## 2023-07-17 NOTE — ASU DISCHARGE PLAN (ADULT/PEDIATRIC) - NS DC INTERPRETER YES NO
No

## 2023-07-24 ENCOUNTER — NON-APPOINTMENT (OUTPATIENT)
Age: 58
End: 2023-07-24

## 2023-08-02 ENCOUNTER — APPOINTMENT (OUTPATIENT)
Dept: BREAST CENTER | Facility: CLINIC | Age: 58
End: 2023-08-02
Payer: COMMERCIAL

## 2023-08-02 VITALS
HEIGHT: 65 IN | SYSTOLIC BLOOD PRESSURE: 131 MMHG | WEIGHT: 200 LBS | DIASTOLIC BLOOD PRESSURE: 86 MMHG | RESPIRATION RATE: 16 BRPM | BODY MASS INDEX: 33.32 KG/M2 | OXYGEN SATURATION: 96 % | HEART RATE: 74 BPM

## 2023-08-02 DIAGNOSIS — N64.89 OTHER SPECIFIED DISORDERS OF BREAST: ICD-10-CM

## 2023-08-02 PROCEDURE — 99213 OFFICE O/P EST LOW 20 MIN: CPT

## 2023-08-02 NOTE — PHYSICAL EXAM
[Normocephalic] : normocephalic [Sclera nonicteric] : sclera nonicteric [Supple] : supple [Normal Sinus Rhythm] : normal sinus rhythm [Symmetrical] : symmetrical [No dominant masses] : no dominant masses in right breast  [No dominant masses] : no dominant masses left breast [No Nipple Retraction] : no left nipple retraction [No Nipple Discharge] : no left nipple discharge [No Axillary Lymphadenopathy] : no left axillary lymphadenopathy [No Edema] : no edema [No Rashes] : no rashes [No Ulceration] : no ulceration

## 2023-08-02 NOTE — HISTORY OF PRESENT ILLNESS
[FreeTextEntry1] : Patient denies no new breast mass, no pain, no redness, no fever Last mammogram February 2023 at [facility]  patient has history of left breast atypia  breast mri is planned  Recent mammogram is unremarkable

## 2023-08-02 NOTE — ASSESSMENT
[FreeTextEntry1] : Schedule for MRI of the Breast history of left breast atypia receiving anti estrogen treatment  f/u six months

## 2023-08-04 ENCOUNTER — NON-APPOINTMENT (OUTPATIENT)
Age: 58
End: 2023-08-04

## 2023-08-04 DIAGNOSIS — N84.0 POLYP OF CORPUS UTERI: ICD-10-CM

## 2023-08-29 ENCOUNTER — OUTPATIENT (OUTPATIENT)
Dept: OUTPATIENT SERVICES | Facility: HOSPITAL | Age: 58
LOS: 1 days | Discharge: HOME | End: 2023-08-29

## 2023-08-29 ENCOUNTER — APPOINTMENT (OUTPATIENT)
Dept: HEMATOLOGY ONCOLOGY | Facility: CLINIC | Age: 58
End: 2023-08-29
Payer: COMMERCIAL

## 2023-08-29 DIAGNOSIS — Z98.890 OTHER SPECIFIED POSTPROCEDURAL STATES: Chronic | ICD-10-CM

## 2023-08-29 DIAGNOSIS — Z98.891 HISTORY OF UTERINE SCAR FROM PREVIOUS SURGERY: Chronic | ICD-10-CM

## 2023-08-29 DIAGNOSIS — K08.409 PARTIAL LOSS OF TEETH, UNSPECIFIED CAUSE, UNSPECIFIED CLASS: Chronic | ICD-10-CM

## 2023-08-29 DIAGNOSIS — Z90.89 ACQUIRED ABSENCE OF OTHER ORGANS: Chronic | ICD-10-CM

## 2023-08-29 PROCEDURE — 99205 OFFICE O/P NEW HI 60 MIN: CPT

## 2023-08-29 NOTE — RESULTS/DATA
[FreeTextEntry1] : #Left breast ALH/LCIS- s/p lumpectomy w/ Dr. Crawford 10/2023- previously followed w/ medical oncology at [facility] Dr. Izquierdo.  Here to establish care w/ med onc at [facility].  Did not tolerate exemestane 2/2 joint pains, weight gain  Switched to tamoxifen 20mg QOD which she tolerates well  At today's visit we discussed that tamoxifen is typically dosed daily- will switch to 10mg PO daily and assess tolerance. She has required D&C twice in the past- will discuss w/ her gynecologist comfort w/ increasing to tamoxifen 20mg daily. We discussed the small <1% risk of endometrial cancer associated with tamoxifen.  we also discussed alternative option of arimidex given strong family history of breast cancer.  will continue to discuss options in the future. would treat for at least 5 years.  obtain records from [facility] from genetics, prior med onc notes  DEXA normal 2/2023, continue calcium and vitamin D  Continue breast imaging and f/u with Dr. Crawford Pending breast MRI - will follow up results  Remains on pancreatic cancer screening trial- alternating EGD w/ MRI abdomen  F/U 3 months w/ MD.  All patient questions answered at today's visit. Patient urged to call the office with any questions or concerns.

## 2023-08-29 NOTE — HISTORY OF PRESENT ILLNESS
[de-identified] : Referred by: Dr. Manuelito Crawford  PCP: Dr. Wilmar Massey  Diagnosis: left breast atypia  Goes by Alex   Ms. Long is a POST-menopausal female who presented at age 58 in 2023 for evaluation of L breast ALH/LCIS.  The patient has a medical history of hypothyroidism, urinary incontinence.   Alex recently transferred care from [facility] to [facility], follows w/ Dr. Manuelito Crawford (breast surgery) for a history of ALH, LCIS s/p lumpectomy. She was previously following w/ Dr. Izquierdo from medical oncology. Left breast biopsy in 2020 revealed ALH. She underwent L breast lumpectomy w/ Dr. Crawford on 10/23/2020, final pathology revealed LCIS, classic type, intraductal papilloma, extensive fibrocystic changes, one benign intramammary LN. Of note the patient has a history of additional right breast biopsies in the past with all benign findings. She was initiated on exemestane which she did not tolerate due to joint pains and is currently taking tamoxifen 20mg every other day. She is tolerating this well without hot flashes, vaginal dryness or discharge. She did require D&C twice in the past - required a D&C for vaginal bleeding in  and then again in 2023 for hyperplasia/polyp - pathology was benign, she follows w/ Dr. Carrera from gynecology. She was most recently seen by Dr. Crawford on 23. She is pending repeat breast MRI.   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 She follows on a clinical trial at [facility] for pancreatic cancer screening- has a strong family history of pancreatic cancer- goes for EGD alternating w/ MRI. She is pending EGD in September.  Denies recent headaches, visual changes, balance issues, CP, cough, SOB, n/v/d, constipation, unintentional weight loss or new bone or back pain. She does have chronic sciatica.   Imaging reviewed:  Last mammogram 2023 at [facility] RAD Pending Breast MRI   Pathology reviewed:  L breast biopsy 9/3/2020- intraductal papilloma w/ calcifications, sclerosing adenosis, ductal hyperplasia Additional site- atypical lobular hyperplasia, lobular intraepithelial neoplasm grade I (ANAND 1), sclerosis adenosis, ductal hyperplasia usual type, cystic apocrine metaplasia  L breast lumpectomy 10/23/2020- LCIS, classic type, intraductal papilloma, extensive fibrocystic changes, one benign intramammary LN Negative for invasive carcinoma or DCIS   Genetics: will obtain records   HCM:  - COVID vaccination: s/p 3 doses  - Colonoscopy: last done , normal  - Gyn: done 2023- had endometrial polyp  - Mammo: 2/15/230 BIRADS 2 - Lung cancer screen: n/a - DEXA: 2/15/23- normal bone density   SH:  - Occupation: works at a [email or web address] as an   - Living situation: lives in [place] with her  - oldest son lives in [facility], daughter lives in [facility], youngest son lives at home with them  - Smoking/etoh/illicits: never smoker, drinks 1-2 glasses socially, denies illicits  - Exercise: active, does pilates, walking   OB/GYN Hx:  - Age of menarche: 8 - Age of menopause: 54 - Pregnancy history: - ages 26, 24 and 22  - Age at live birth: 30 - OCP use: 6 years  - Fertility treatments: n/a - Hormonal therapy: n/a - Breast feeding history: briefly   FH:  - mother had pancreatic cancer at age 62 - her sister had pancreatic cancer at age 65 - her maternal uncle had pancreatic cancer at age 67 - her mother also had breast cancer at age 65  - her younger sister had breast cancer at age 60

## 2023-08-29 NOTE — CONSULT LETTER
[Dear  ___] : Dear  [unfilled], [Consult Letter:] : I had the pleasure of evaluating your patient, [unfilled]. [Please see my note below.] : Please see my note below. [Consult Closing:] : Thank you very much for allowing me to participate in the care of this patient.  If you have any questions, please do not hesitate to contact me. [Sincerely,] : Sincerely, [FreeTextEntry2] : Dr. Manuelito Crawford  [FreeTextEntry3] : Dr. Geni Pierre  [DrCeline  ___] : Dr. SWARTZ

## 2023-08-29 NOTE — PHYSICAL EXAM
[Fully active, able to carry on all pre-disease performance without restriction] : Status 0 - Fully active, able to carry on all pre-disease performance without restriction [Normal] : affect appropriate [de-identified] : well appearing female, NAD, pleasant [de-identified] : s/p L breast lumpectomy, L breast keloid at incision, R breast biopsy sites seen, no palpable masses or LAD bilaterally, nodular breasts

## 2023-08-31 NOTE — ASU PATIENT PROFILE, ADULT - MEDICATION ADMINISTRATION INFO, PROFILE
Wt Readings from Last 3 Encounters:   08/30/23 83.9 kg (184 lb 15.5 oz)   06/28/23 76.2 kg (168 lb)   06/16/23 76.6 kg (168 lb 12.8 oz)   ·     Intake/Output Summary (Last 24 hours) at 8/31/2023 1259  Last data filed at 8/31/2023 1200  Gross per 24 hour   Intake 1944.66 ml   Output 3750 ml   Net -1805.34 ml   I/O last 24 hours: In: 3148 [P.O.:100; I.V.:1275; Blood:400; NG/GT:583; IV Piggyback:790]  Out: 4449 [Urine:4575]    - Last Echo 6/2023: LV cavity size is normal. Wall thickness is normal. There is concentric remodeling. LVEF 60%. Systolic function is normal. Basal inferoseptal segment is hypokinetic. RV nd RA mildly dilated. - Most recent Echo 8/31/23: LVEF 65%, mild-mod aortic, mitral and tricuspid regurgitaton    Plan:  · Continue home medication, torsemide 40 mg daily.   · Monitor accurate I/O's no concerns

## 2023-09-07 DIAGNOSIS — D05.00 LOBULAR CARCINOMA IN SITU OF UNSPECIFIED BREAST: ICD-10-CM

## 2023-09-08 DIAGNOSIS — D05.02 LOBULAR CARCINOMA IN SITU OF LEFT BREAST: ICD-10-CM

## 2023-09-18 ENCOUNTER — APPOINTMENT (OUTPATIENT)
Dept: FAMILY MEDICINE | Facility: CLINIC | Age: 58
End: 2023-09-18

## 2023-10-03 ENCOUNTER — RX RENEWAL (OUTPATIENT)
Age: 58
End: 2023-10-03

## 2023-10-04 ENCOUNTER — APPOINTMENT (OUTPATIENT)
Dept: FAMILY MEDICINE | Facility: CLINIC | Age: 58
End: 2023-10-04
Payer: COMMERCIAL

## 2023-10-04 VITALS
OXYGEN SATURATION: 96 % | HEIGHT: 65 IN | TEMPERATURE: 97 F | HEART RATE: 87 BPM | DIASTOLIC BLOOD PRESSURE: 72 MMHG | SYSTOLIC BLOOD PRESSURE: 112 MMHG | WEIGHT: 204 LBS | BODY MASS INDEX: 33.99 KG/M2

## 2023-10-04 DIAGNOSIS — H92.09 OTALGIA, UNSPECIFIED EAR: ICD-10-CM

## 2023-10-04 PROCEDURE — 99213 OFFICE O/P EST LOW 20 MIN: CPT

## 2023-10-04 RX ORDER — CYCLOBENZAPRINE HYDROCHLORIDE 10 MG/1
10 TABLET, FILM COATED ORAL 3 TIMES DAILY
Qty: 30 | Refills: 3 | Status: ACTIVE | COMMUNITY
Start: 2023-10-04 | End: 1900-01-01

## 2023-10-04 RX ORDER — NALTREXONE HYDROCHLORIDE AND BUPROPION HYDROCHLORIDE 8; 90 MG/1; MG/1
8-90 TABLET, EXTENDED RELEASE ORAL
Qty: 90 | Refills: 0 | Status: DISCONTINUED | COMMUNITY
Start: 2023-06-05 | End: 2023-10-04

## 2023-10-04 RX ORDER — TAMOXIFEN CITRATE 20 MG/1
20 TABLET, FILM COATED ORAL
Qty: 36 | Refills: 0 | Status: COMPLETED | COMMUNITY
Start: 2022-06-21 | End: 2023-10-04

## 2023-10-17 ENCOUNTER — NON-APPOINTMENT (OUTPATIENT)
Age: 58
End: 2023-10-17

## 2023-10-18 DIAGNOSIS — Z85.3 PERSONAL HISTORY OF MALIGNANT NEOPLASM OF BREAST: ICD-10-CM

## 2023-10-18 RX ORDER — LEVOTHYROXINE SODIUM 0.15 MG/1
150 TABLET ORAL
Refills: 0 | Status: ACTIVE | COMMUNITY

## 2023-10-18 RX ORDER — VITAMIN K2 90 MCG
125 MCG CAPSULE ORAL
Refills: 0 | Status: ACTIVE | COMMUNITY

## 2023-11-01 ENCOUNTER — APPOINTMENT (OUTPATIENT)
Dept: FAMILY MEDICINE | Facility: CLINIC | Age: 58
End: 2023-11-01

## 2023-11-06 ENCOUNTER — RESULT REVIEW (OUTPATIENT)
Age: 58
End: 2023-11-06

## 2023-11-07 ENCOUNTER — OUTPATIENT (OUTPATIENT)
Dept: OUTPATIENT SERVICES | Facility: HOSPITAL | Age: 58
LOS: 1 days | End: 2023-11-07
Payer: COMMERCIAL

## 2023-11-07 ENCOUNTER — APPOINTMENT (OUTPATIENT)
Dept: RADIOLOGY | Facility: CLINIC | Age: 58
End: 2023-11-07
Payer: COMMERCIAL

## 2023-11-07 DIAGNOSIS — Z90.89 ACQUIRED ABSENCE OF OTHER ORGANS: Chronic | ICD-10-CM

## 2023-11-07 DIAGNOSIS — M54.31 SCIATICA, RIGHT SIDE: ICD-10-CM

## 2023-11-07 DIAGNOSIS — Z98.890 OTHER SPECIFIED POSTPROCEDURAL STATES: Chronic | ICD-10-CM

## 2023-11-07 DIAGNOSIS — Z98.891 HISTORY OF UTERINE SCAR FROM PREVIOUS SURGERY: Chronic | ICD-10-CM

## 2023-11-07 DIAGNOSIS — K08.409 PARTIAL LOSS OF TEETH, UNSPECIFIED CAUSE, UNSPECIFIED CLASS: Chronic | ICD-10-CM

## 2023-11-07 PROCEDURE — 72100 X-RAY EXAM L-S SPINE 2/3 VWS: CPT

## 2023-11-07 PROCEDURE — 72100 X-RAY EXAM L-S SPINE 2/3 VWS: CPT | Mod: 26

## 2023-11-16 ENCOUNTER — OUTPATIENT (OUTPATIENT)
Dept: OUTPATIENT SERVICES | Facility: HOSPITAL | Age: 58
LOS: 1 days | Discharge: HOME | End: 2023-11-16

## 2023-11-16 DIAGNOSIS — Z98.891 HISTORY OF UTERINE SCAR FROM PREVIOUS SURGERY: Chronic | ICD-10-CM

## 2023-11-16 DIAGNOSIS — K08.409 PARTIAL LOSS OF TEETH, UNSPECIFIED CAUSE, UNSPECIFIED CLASS: Chronic | ICD-10-CM

## 2023-11-16 DIAGNOSIS — Z90.89 ACQUIRED ABSENCE OF OTHER ORGANS: Chronic | ICD-10-CM

## 2023-11-16 DIAGNOSIS — D05.00 LOBULAR CARCINOMA IN SITU OF UNSPECIFIED BREAST: ICD-10-CM

## 2023-11-16 DIAGNOSIS — Z98.890 OTHER SPECIFIED POSTPROCEDURAL STATES: Chronic | ICD-10-CM

## 2023-12-12 ENCOUNTER — APPOINTMENT (OUTPATIENT)
Dept: HEMATOLOGY ONCOLOGY | Facility: CLINIC | Age: 58
End: 2023-12-12
Payer: COMMERCIAL

## 2023-12-12 VITALS
OXYGEN SATURATION: 98 % | HEART RATE: 81 BPM | BODY MASS INDEX: 33.72 KG/M2 | HEIGHT: 65 IN | DIASTOLIC BLOOD PRESSURE: 89 MMHG | SYSTOLIC BLOOD PRESSURE: 139 MMHG | RESPIRATION RATE: 16 BRPM | TEMPERATURE: 98.3 F | WEIGHT: 202.38 LBS

## 2023-12-12 PROCEDURE — 99214 OFFICE O/P EST MOD 30 MIN: CPT

## 2023-12-14 DIAGNOSIS — D05.02 LOBULAR CARCINOMA IN SITU OF LEFT BREAST: ICD-10-CM

## 2023-12-18 ENCOUNTER — APPOINTMENT (OUTPATIENT)
Dept: RHEUMATOLOGY | Facility: CLINIC | Age: 58
End: 2023-12-18
Payer: COMMERCIAL

## 2023-12-18 VITALS
HEART RATE: 82 BPM | TEMPERATURE: 97 F | HEIGHT: 65 IN | WEIGHT: 199 LBS | BODY MASS INDEX: 33.15 KG/M2 | SYSTOLIC BLOOD PRESSURE: 130 MMHG | DIASTOLIC BLOOD PRESSURE: 80 MMHG | OXYGEN SATURATION: 97 %

## 2023-12-18 DIAGNOSIS — R35.0 FREQUENCY OF MICTURITION: ICD-10-CM

## 2023-12-18 DIAGNOSIS — M54.31 SCIATICA, RIGHT SIDE: ICD-10-CM

## 2023-12-18 DIAGNOSIS — M35.00 SICCA SYNDROME, UNSPECIFIED: ICD-10-CM

## 2023-12-18 PROCEDURE — 99204 OFFICE O/P NEW MOD 45 MIN: CPT

## 2023-12-18 NOTE — PHYSICAL EXAM
[TextEntry] :   GENERAL: Appears in no acute distress HEENT: EOMI. No conjunctival erythema. dry oral mucous membranes. No nasopharyngeal ulcers NECK: Supple, no cervical lymphadenopathy CARDIOVASCULAR: RRR. S1, S2 auscultated. No murmurs PULMONARY: Clear to auscultation b/l, no wheezes, rales, or crackles ABDOMINAL: Soft, nontender, nondistended.  MSK: No active synovitis, swelling, erythema, or warmth. No joint tenderness to palpation. No dactylitis, enthesitis, nail pitting No Bouchards or Heberdens nodes No deformities. No crepitus. Normal ROM of neck, back, b/l upper and lower extremities. SKIN: No lesions or rashes NEURO: No focal deficits. Motor strength 5/5 in major muscle groups of b/l UE and LE. Sensation to soft touch intact in major dermatomes of b/l UE and LE. PSYCH:Normal affect and thought process.   
03-May-2023 15:03

## 2023-12-18 NOTE — ASSESSMENT
[FreeTextEntry1] : 58-year-old female with past medical history of hyperlipidemia, hypothyroidism,  left breast ALH/LCIS. status post lumpectomy 10/2020, right-sided sciatica  Coming in for evaluation of Sjogren's syndrome  Patient has had dry eyes and dry mouth for the last few months. She has been using eye drops called Lumify for puffy eyes and since then noticed her symptoms have increased.  She chews a lot of gum too Of note, also on solifenacin succiante for bladder issues but that medication is not new. She feels sicca symptoms for 3 months.. Hasn't seen ophthalmologist and dentist yet for these symptoms.  Patient does not have signs of underlying connective tissue diseases (CTDs) including inflammatory joint pain, malar rash, photosensitivity, Raynauds.. Exam without synovitis, rashes, changes of Raynauds. There is low suspicion for underlying CTD.  Given new onset sicca symptoms, will investigate further with Sjogren's antibodies Advised patient to see ophthalmology for evaluation of dry eyes.  She also sees dentist regularly and reports no new cavities. Discussed with patient that definite diagnosis would be a minor gland lip biopsy however at this point given low suspicion will start with testing for SSA SSB  Trial off Lumify eye drops also advised given correlation of timing of symptoms.  She is also on solifenacin succiante for bladder issues chronically.   Patient agreed with above plan.

## 2023-12-18 NOTE — HISTORY OF PRESENT ILLNESS
[FreeTextEntry1] : 58-year-old female with past medical history of hyperlipidemia, hypothyroidism,  left breast ALH/LCIS. status post lumpectomy 10/2020, right-sided sciatica  Coming in for evaluation of Sjogren's syndrome  Patient has had dry eyes and dry mouth for the last few months. She has been using eye drops called Lumify for puffy eyes and since then noticed her symptoms have increased.  She chews a lot of gum too Of note, also on solifenacin succiante for bladder issues but that medication is not new. She feels sicca symptoms for 3 months.. Hasn't seen ophthalmologist and dentist yet for these symptoms. Also reports chronic dry skin also. Denies peripheral neuropathy except with sciatica episodes where the right leg bothers her when sciatica symptoms are strong (pain from buttocks down) without back pain Patient denies joint pains, joint swelling, joint erythema/warmth, morning stiffness, fatigue, fever, chills, weight loss, nasopharyngeal ulcers, chest pain, abdominal pain, palpitations, cough, SOB, nausea, vomiting, diarrhea, , blood in stool, dysuria, hematuria, rash, photosensitivity, Raynaud's, alopecia,  , headaches, eye pain/redness, vision changes, myalgias, muscle weakness, dysphagia, jaw claudication, numbness/tingling,  Hx of DVT/PEs.   PMHx: As above PSHx:  History of Breast Surgery Lumpectomy  History of  Section  History of Dilation and curettage X3 (related to tamoxifen) History of Skull fracture repair age 3 History of Tonsillectomy  Family Hx: Younger siter has sjogrens , elder sister had RA (now passed away) Denies family history of rheumatologic conditions including RA, SLE, Sjogren's, Myositis, scleroderma, or vasculitis Social Hx:  Smoking Hx: denies EtOH Hx: social Drug use: denies Occupation:   , 3 kids

## 2023-12-20 ENCOUNTER — RX RENEWAL (OUTPATIENT)
Age: 58
End: 2023-12-20

## 2023-12-21 LAB
ENA SS-A AB SER IA-ACNC: <0.2 AL
ENA SS-B AB SER IA-ACNC: <0.2 AL

## 2023-12-29 RX ORDER — ALPRAZOLAM 0.5 MG/1
0.5 TABLET ORAL DAILY
Qty: 15 | Refills: 0 | Status: ACTIVE | COMMUNITY
Start: 2022-01-05 | End: 1900-01-01

## 2024-01-06 ENCOUNTER — OUTPATIENT (OUTPATIENT)
Dept: OUTPATIENT SERVICES | Facility: HOSPITAL | Age: 59
LOS: 1 days | End: 2024-01-06
Payer: COMMERCIAL

## 2024-01-06 ENCOUNTER — APPOINTMENT (OUTPATIENT)
Dept: MRI IMAGING | Facility: CLINIC | Age: 59
End: 2024-01-06
Payer: COMMERCIAL

## 2024-01-06 DIAGNOSIS — Z98.890 OTHER SPECIFIED POSTPROCEDURAL STATES: Chronic | ICD-10-CM

## 2024-01-06 DIAGNOSIS — K08.409 PARTIAL LOSS OF TEETH, UNSPECIFIED CAUSE, UNSPECIFIED CLASS: Chronic | ICD-10-CM

## 2024-01-06 DIAGNOSIS — Z00.8 ENCOUNTER FOR OTHER GENERAL EXAMINATION: ICD-10-CM

## 2024-01-06 DIAGNOSIS — Z98.891 HISTORY OF UTERINE SCAR FROM PREVIOUS SURGERY: Chronic | ICD-10-CM

## 2024-01-06 DIAGNOSIS — Z90.89 ACQUIRED ABSENCE OF OTHER ORGANS: Chronic | ICD-10-CM

## 2024-01-06 DIAGNOSIS — M54.31 SCIATICA, RIGHT SIDE: ICD-10-CM

## 2024-01-06 PROCEDURE — 72148 MRI LUMBAR SPINE W/O DYE: CPT | Mod: 26

## 2024-01-06 PROCEDURE — 72148 MRI LUMBAR SPINE W/O DYE: CPT

## 2024-01-10 RX ORDER — LIDOCAINE 5% 700 MG/1
5 PATCH TOPICAL
Qty: 14 | Refills: 0 | Status: ACTIVE | COMMUNITY
Start: 2023-11-08 | End: 1900-01-01

## 2024-01-19 ENCOUNTER — APPOINTMENT (OUTPATIENT)
Dept: OBGYN | Facility: CLINIC | Age: 59
End: 2024-01-19

## 2024-01-24 ENCOUNTER — APPOINTMENT (OUTPATIENT)
Dept: NEUROSURGERY | Facility: CLINIC | Age: 59
End: 2024-01-24
Payer: COMMERCIAL

## 2024-01-24 VITALS
OXYGEN SATURATION: 98 % | SYSTOLIC BLOOD PRESSURE: 143 MMHG | HEIGHT: 65 IN | WEIGHT: 199 LBS | DIASTOLIC BLOOD PRESSURE: 94 MMHG | BODY MASS INDEX: 33.15 KG/M2 | HEART RATE: 81 BPM

## 2024-01-24 DIAGNOSIS — M43.16 SPONDYLOLISTHESIS, LUMBAR REGION: ICD-10-CM

## 2024-01-24 DIAGNOSIS — M41.50 OTHER SECONDARY SCOLIOSIS, SITE UNSPECIFIED: ICD-10-CM

## 2024-01-24 PROCEDURE — 99204 OFFICE O/P NEW MOD 45 MIN: CPT

## 2024-01-24 NOTE — CONSULT LETTER
[Dear  ___] : Dear  [unfilled], [Courtesy Letter:] : I had the pleasure of seeing your patient, [unfilled], in my office today. [Sincerely,] : Sincerely, [FreeTextEntry1] : Mrs. Long is a very pleasant 59-year-old female patient was seen in our office today regarding right-sided leg pain.  The patient endorses an insidious onset of right-sided leg pain starting in June 2023.  The patient does not recall any specific inciting event or trauma leading up to this pain.  The patient's pain radiates from the back down the lateral aspect of the right thigh and onto the anterior aspect of her lower leg most consistent with an L5 nerve root dermatome.  The patient has attempted Motrin, a muscle relaxant, and lidocaine patches with some relief.  The patient's pain is not constant but does worsen with transitioning from a seated to standing position as well as standing and walking longer distances.  The patient denies any new bowel or bladder symptoms.  The patient denies any paresthesias or numbness in the right leg but describes her pain as a burning sensation.  The patient endorses an allergy to latex which causes a rash.  The patient's past medical history significant for hypothyroidism.  The patient's current medical regimen includes levothyroxine, Solifenacin, tamoxifen, and 81 mg of aspirin.  The patient is also taking several multivitamins.  On examination, the patient is alert, oriented, and compliant with the exam.  The patient demonstrates full strength in the lower extremities bilaterally with hip flexion, knee extension, ankle dorsiflexion, ankle plantarflexion, and extension of the hallucis longus.  The patient is noted to carry her right shoulder slightly lower than the left.  The patient ambulates well.  The patient does not have a Ashwini sign or ankle clonus.  The patient is accompanied with an MRI scan of the lumbar spine performed on January 6, 2024.  These images demonstrate a mild degenerative levoscoliosis centered at L4.  There is an associated spondylolisthesis at L4/5 as well as foraminal narrowing bilaterally but without overt nerve root impingement on these supine scans at the level of the foramen.  At L4/5 on the right, there is lateral recess stenosis possibly causing traversing nerve root impingement. Severe facet arthrosis is noted at L4/5 and L5/S1 bilaterally. Standing x-rays of the lumbar spine performed on November 7, 2023 demonstrates again the patient's scoliosis and spondylolisthesis  Taken together, the patient has a clinical history and radiographic findings with an intermittent right-sided lumbar radiculopathy secondary to a scoliotic deformity and spondylolisthesis.  I explained to the patient that first-line conservative treatment would be physical therapy but I have recommended leg length x-rays and scoliosis x-rays prior to starting physical therapy.  The range of therapeutic options were discussed with the patient including physical therapy, massage therapy, acupuncture, chiropractic manipulation, medications, injections, and ultimately surgery.  At this time, the patient states that she would simply like to start physical therapy and defer any other treatments for now.  Given her good clinical status I think this is very reasonable and we will be in contact with the patient with the results of her x-rays so that I can provide a more targeted physical therapy prescription.  The patient has been recommended follow-up with us in approximately 6 weeks to reevaluate her progress. [FreeTextEntry3] : Nadeem Laird MD, PhD, CS, FAANS Attending Neurosurgeon  of Neurosurgery [facility] at [facility] at [address], [place] [address] Office: [phone] Fax: [phone]

## 2024-01-30 PROBLEM — Z12.4 CERVICAL CANCER SCREENING: Status: ACTIVE | Noted: 2024-01-30

## 2024-01-30 PROBLEM — Z12.11 COLON CANCER SCREENING: Status: ACTIVE | Noted: 2024-01-30

## 2024-02-06 ENCOUNTER — APPOINTMENT (OUTPATIENT)
Dept: OBGYN | Facility: CLINIC | Age: 59
End: 2024-02-06

## 2024-02-06 DIAGNOSIS — Z12.11 ENCOUNTER FOR SCREENING FOR MALIGNANT NEOPLASM OF COLON: ICD-10-CM

## 2024-02-06 DIAGNOSIS — Z12.4 ENCOUNTER FOR SCREENING FOR MALIGNANT NEOPLASM OF CERVIX: ICD-10-CM

## 2024-02-23 ENCOUNTER — APPOINTMENT (OUTPATIENT)
Dept: DERMATOLOGY | Facility: CLINIC | Age: 59
End: 2024-02-23
Payer: COMMERCIAL

## 2024-02-23 ENCOUNTER — NON-APPOINTMENT (OUTPATIENT)
Age: 59
End: 2024-02-23

## 2024-02-23 DIAGNOSIS — D18.01 HEMANGIOMA OF SKIN AND SUBCUTANEOUS TISSUE: ICD-10-CM

## 2024-02-23 DIAGNOSIS — L82.1 OTHER SEBORRHEIC KERATOSIS: ICD-10-CM

## 2024-02-23 DIAGNOSIS — D22.9 MELANOCYTIC NEVI, UNSPECIFIED: ICD-10-CM

## 2024-02-23 DIAGNOSIS — L91.0 HYPERTROPHIC SCAR: ICD-10-CM

## 2024-02-23 DIAGNOSIS — L81.4 OTHER MELANIN HYPERPIGMENTATION: ICD-10-CM

## 2024-02-23 DIAGNOSIS — L30.9 DERMATITIS, UNSPECIFIED: ICD-10-CM

## 2024-02-23 PROCEDURE — 99204 OFFICE O/P NEW MOD 45 MIN: CPT

## 2024-02-23 RX ORDER — TRIAMCINOLONE ACETONIDE 1 MG/G
0.1 CREAM TOPICAL
Qty: 1 | Refills: 1 | Status: ACTIVE | COMMUNITY
Start: 2024-02-23 | End: 1900-01-01

## 2024-02-23 NOTE — ASSESSMENT
[FreeTextEntry1] : A complete skin examination was performed.  There is no evidence of skin cancer.  We discussed the importance of photoprotection, including the use of hats, protective clothing and sunscreens with an SPF of at least 30.  Sun avoidance was also discussed.  The ABCDE's of melanoma were discussed.  Regular skin exams recommended.   Eczema:  We have discussed the nature and course of this condition. We have discussed treatment options, expectations from treatments, and associated side effects of topical therapies.  Triamcinolone 0.1% cream BID x 1-2 weeks, prn flares  Discussed risks and benefits of topical steroids, including atrophy, telangiectasias, striae, acneiform eruption, purpura, changes in pigmentation, and hypertrichosis.  Recommend gentle cleansers and moisturizers. Avoid fragrances or scented personal care products. Recommend short, lukewarm showers.   Hypertrophic scar: Recommended ScarAway silicone sheets Discussed ILK injections; patient declined

## 2024-02-23 NOTE — HISTORY OF PRESENT ILLNESS
[FreeTextEntry1] : FBSE [de-identified] : Patient presents for skin check; No itching, bleeding, growing, changing lesions noted. No personal or family hx of skin cancer. Patient has a rash that she gets in the winter. Patient admits to taking hot showers.

## 2024-02-23 NOTE — PHYSICAL EXAM
[Alert] : alert [Oriented x 3] : ~L oriented x 3 [Well Nourished] : well nourished [Conjunctiva Non-injected] : conjunctiva non-injected [No Visual Lymphadenopathy] : no visual  lymphadenopathy [No Clubbing] : no clubbing [No Edema] : no edema [No Bromhidrosis] : no bromhidrosis [No Chromhidrosis] : no chromhidrosis [FreeTextEntry3] : A full skin exam was performed including the scalp, face (including the lips, ears, nose and eyes), neck, chest, breasts, abdomen, back, buttocks, upper extremities and lower extremities.  The patient declined examination of the genitalia.   The exam revealed the following benign growths:  Pigmented nevi.  Seborrheic keratoses.  Lentigines.  Cherry angiomas.  pink, thick, linear scar left breast  Pink to red, eczematous patches on the arms

## 2024-02-28 ENCOUNTER — OUTPATIENT (OUTPATIENT)
Dept: OUTPATIENT SERVICES | Facility: HOSPITAL | Age: 59
LOS: 1 days | End: 2024-02-28
Payer: COMMERCIAL

## 2024-02-28 ENCOUNTER — APPOINTMENT (OUTPATIENT)
Dept: RADIOLOGY | Facility: CLINIC | Age: 59
End: 2024-02-28
Payer: COMMERCIAL

## 2024-02-28 DIAGNOSIS — K08.409 PARTIAL LOSS OF TEETH, UNSPECIFIED CAUSE, UNSPECIFIED CLASS: Chronic | ICD-10-CM

## 2024-02-28 DIAGNOSIS — Z98.890 OTHER SPECIFIED POSTPROCEDURAL STATES: Chronic | ICD-10-CM

## 2024-02-28 DIAGNOSIS — M54.31 SCIATICA, RIGHT SIDE: ICD-10-CM

## 2024-02-28 PROCEDURE — 72082 X-RAY EXAM ENTIRE SPI 2/3 VW: CPT | Mod: 26

## 2024-02-28 PROCEDURE — 77073 BONE LENGTH STUDIES: CPT | Mod: 26

## 2024-02-28 PROCEDURE — 72082 X-RAY EXAM ENTIRE SPI 2/3 VW: CPT

## 2024-02-28 PROCEDURE — 77073 BONE LENGTH STUDIES: CPT

## 2024-03-06 ENCOUNTER — APPOINTMENT (OUTPATIENT)
Dept: NEUROSURGERY | Facility: CLINIC | Age: 59
End: 2024-03-06
Payer: COMMERCIAL

## 2024-03-06 VITALS — SYSTOLIC BLOOD PRESSURE: 134 MMHG | DIASTOLIC BLOOD PRESSURE: 85 MMHG | OXYGEN SATURATION: 99 % | HEART RATE: 81 BPM

## 2024-03-06 DIAGNOSIS — M54.16 RADICULOPATHY, LUMBAR REGION: ICD-10-CM

## 2024-03-06 PROCEDURE — 99214 OFFICE O/P EST MOD 30 MIN: CPT

## 2024-03-07 PROBLEM — M54.16 RIGHT LUMBAR RADICULOPATHY: Status: ACTIVE | Noted: 2024-01-24

## 2024-03-07 NOTE — CONSULT LETTER
[Dear  ___] : Dear  [unfilled], [Courtesy Letter:] : I had the pleasure of seeing your patient, [unfilled], in my office today. [Sincerely,] : Sincerely, [FreeTextEntry3] : Nadeem Laird MD, PhD, CS, FAANS Attending Neurosurgeon  of Neurosurgery [facility] at [facility] at [address], [place] [address] Office: [phone] Fax: [phone]  [FreeTextEntry1] : Mrs. Long is a very pleasant 59-year-old female patient who was seen in our office today in follow-up.  The patient was previously diagnosed with a right-sided lumbar radiculopathy secondary to lumbar stenosis.  I am happy to report that the patient is continuing to manage her symptoms well with conservative therapy alone.  At this time, the patient states that her symptoms are more of an annoyance than severe pain.  The patient has recently joined a gym and has been exercising regularly which has provided significant relief.  The patient also takes Advil which helps with her pain though muscle relaxants do not.  The patient continues to experience back and leg pain after prolonged sitting or standing.  On examination, the patient remains alert, oriented, and compliant with the exam.  The patient continues to demonstrate full strength in the upper and lower extremities bilaterally.  The patient does not have a Ashwini sign or ankle clonus.  The patient ambulates well.  The patient is accompanied with a scoliosis x-ray performed on February 28, 2024.  These images demonstrate good sagittal balance within normal limits measuring from the C7 yonatan line.  The patient does have loss of cervical lordosis, however.  The patient's leg length x-rays returned essentially normal with only a 5 mm difference.  The patient is accompanied with an MRI scan of the lumbar spine which was previously reviewed with her dated January 6, 2024.  These images demonstrate scoliotic deformity with minimal nerve root compression except at L4/5 on the right lateral recess possibly causing traversing nerve root impingement.  Taken together, I am gratified to see the patient doing well with conservative therapy alone.  At this time, the patient seems to be managing well clinically and thus I have recommended against surgical intervention.  The patient is aware that she is a surgical candidate should she develop and unremitting pain refractory to conservative therapy.  The patient is currently pleased with her progress and will be continuing her home exercise regimen.  The patient is aware that she may contact our office at any time should she need a physical therapy prescription for formal physical therapy.  The patient has been recommended several activity modifications including the use of a transitional table which allows her to change from a seated to standing position regularly throughout the day which would also likely help with her symptoms.  The patient will be following up with us on an as-needed basis at her request and we look forward to seeing her back in the office should the need arise.

## 2024-03-25 ENCOUNTER — RX RENEWAL (OUTPATIENT)
Age: 59
End: 2024-03-25

## 2024-04-07 NOTE — HISTORY OF PRESENT ILLNESS
Nieves Carapriyank Murphy  68 y.o.  female  Chief Complaint   Patient presents with    T-5000 GLF     Pt tried to get out of her recliner last night, felt weak & fell to her knees. Friends found her on the ground this morning. Does not remember if she hit her head, however R eye appears bruised. On blood thinners for DVT, unsure which. Pt is oriented but groggy & does not fully recall falling. Pt smells of urine, is slow to respond.     Pt to charge desk as TBI activation, seen by ERP then to blue 26 (unable to scan immediately due to high volume of STAT Cts).     BIB EMS from home. .   [FreeTextEntry1] : Follow up HLD, hypothyroidism, anxiety. [de-identified] : Patient is a 58yo female with PMH HLD, hypothyroidism, anxiety, breast cancer who presents to the office for follow up. \par \par Pt has history of HLD with elevated total cholesterol, TG, and LDL with low HDL.  Pt had b/w in 04/2022 at her CPE which revealed lower TG, higher HDL, and stable total cholesterol and LDL.  Pt encouraged to work on diet and exercise.  States diet has been fair.  Pt states she exercises regularly, walking.  Pt is not currently on a statin medication.  Pt was previously on Exemestane, did not realize could raise cholesterol so decided to go off of it and is now on Tamoxifen.\par \par Pt is taking Levothyroxine 125mcg daily for hypothyroidism.  Pt had normal thyroid labs in 02/2022 and feels well, will defer thyroid testing for next CPE. \par \par Pt takes Xanax 0.25mg daily PRN sparingly as needed, admits to taking it approximately 1-2 times per month.  Pt does not follow with therapist or psychiatrist.

## 2024-06-18 ENCOUNTER — APPOINTMENT (OUTPATIENT)
Dept: HEMATOLOGY ONCOLOGY | Facility: CLINIC | Age: 59
End: 2024-06-18
Payer: COMMERCIAL

## 2024-06-18 VITALS
OXYGEN SATURATION: 96 % | TEMPERATURE: 97.6 F | SYSTOLIC BLOOD PRESSURE: 126 MMHG | WEIGHT: 204 LBS | DIASTOLIC BLOOD PRESSURE: 63 MMHG | BODY MASS INDEX: 33.99 KG/M2 | HEART RATE: 79 BPM | HEIGHT: 65 IN

## 2024-06-18 DIAGNOSIS — D05.02 LOBULAR CARCINOMA IN SITU OF LEFT BREAST: ICD-10-CM

## 2024-06-18 PROCEDURE — 99214 OFFICE O/P EST MOD 30 MIN: CPT

## 2024-06-18 PROCEDURE — G2211 COMPLEX E/M VISIT ADD ON: CPT

## 2024-06-18 RX ORDER — TAMOXIFEN CITRATE 10 MG/1
10 TABLET, FILM COATED ORAL DAILY
Qty: 90 | Refills: 3 | Status: ACTIVE | COMMUNITY
Start: 2023-08-29 | End: 1900-01-01

## 2024-06-18 NOTE — RESULTS/DATA
[FreeTextEntry1] : #Left breast ALH/LCIS- s/p lumpectomy w/ Dr. Crawford 10/2023- previously followed w/ medical oncology at [facility] Dr. Izquierdo.  Initially presented to establish care w/ med onc at [facility].  Did not tolerate exemestane 2/2 joint pains, weight gain  Switched to tamoxifen 20mg QOD which she tolerates well  Switched to tamoxifen 10 mg po daily w/ good tolerance overall No recent vaginal bleeding She has required D&C twice in the past. We discussed the small <1% risk of endometrial cancer associated with tamoxifen.  OK to continue tamoxifen per GYN.  Recommended rheumatology eval for Sjogrens. Also can try replense, revaree, coconut oil suppository.  DEXA normal 2/2023, continue calcium and vitamin D  Continue breast imaging and f/u with Dr. Crawford Remains on pancreatic cancer screening trial at [facility] - alternating EGD w/ MRI abdomen  EGD for pancreatic cancer screen 9/2023- normal  Discussed wt loss, emotional support/encouragement provided RTC 6 months to see Dr. Pierre as scheduled (f/u sooner prn)  All patient questions answered at today's visit. Patient urged to call the office with any questions or concerns.

## 2024-06-18 NOTE — PHYSICAL EXAM
[Fully active, able to carry on all pre-disease performance without restriction] : Status 0 - Fully active, able to carry on all pre-disease performance without restriction [Normal] : affect appropriate [de-identified] : well appearing female, NAD, pleasant [de-identified] : s/p L breast lumpectomy, L breast keloid at incision, R breast biopsy sites seen, no palpable masses or LAD bilaterally, nodular breasts

## 2024-06-18 NOTE — HISTORY OF PRESENT ILLNESS
[de-identified] : Referred by: Dr. Manuelito Crawford  PCP: Dr. Wilmar Massey  Diagnosis: left breast atypia  Goes by Alex   Ms. Long is a POST-menopausal female who presented at age 58 in 2023 for evaluation of L breast ALH/LCIS.  The patient has a medical history of hypothyroidism, urinary incontinence.   Alex transferred care from [facility] to [facility], follows w/ Dr. Manuelito Crawford (breast surgery) for a history of ALH, LCIS s/p lumpectomy. She was previously following w/ Dr. Izquierdo from medical oncology. Left breast biopsy in 2020 revealed ALH. She underwent L breast lumpectomy w/ Dr. Crawford on 10/23/2020, final pathology revealed LCIS, classic type, intraductal papilloma, extensive fibrocystic changes, one benign intramammary LN. Of note the patient has a history of additional right breast biopsies in the past with all benign findings. She was initiated on exemestane which she did not tolerate due to joint pains and is currently taking tamoxifen 20mg every other day. She is tolerating this well without hot flashes, vaginal dryness or discharge. She did require D&C twice in the past - required a D&C for vaginal bleeding in  and then again in 2023 for hyperplasia/polyp - pathology was benign, she follows w/ Dr. Carrera from gynecology. She had been most recently seen by Dr. Crawford on 23. She is pending repeat breast MRI.   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 She follows on a clinical trial at [facility] for pancreatic cancer screening- has a strong family history of pancreatic cancer- goes for EGD alternating w/ MRI. She is pending EGD in September.  Denied recent headaches, visual changes, balance issues, CP, cough, SOB, n/v/d, constipation, unintentional weight loss or new bone or back pain. She does have chronic sciatica.   Imaging reviewed:  Last mammogram 2023 at [facility] RAD Pending Breast MRI   Pathology reviewed:  L breast biopsy 9/3/2020- intraductal papilloma w/ calcifications, sclerosing adenosis, ductal hyperplasia Additional site- atypical lobular hyperplasia, lobular intraepithelial neoplasm grade I (ANAND 1), sclerosis adenosis, ductal hyperplasia usual type, cystic apocrine metaplasia  L breast lumpectomy 10/23/2020- LCIS, classic type, intraductal papilloma, extensive fibrocystic changes, one benign intramammary LN Negative for invasive carcinoma or DCIS   SH:  - Occupation: works at a [employer] as an   - Living situation: lives in [place] with her  - oldest son lives in [facility] [place], daughter lives in [facility], youngest son lives at home with them  - Smoking/etoh/illicits: never smoker, drinks 1-2 glasses socially, denies illicits  - Exercise: active, does pilates, walking   OB/GYN Hx:  - Age of menarche: 8 - Age of menopause: 54 - Pregnancy history: - ages 26, 24 and 22  - Age at live birth: 30 - OCP use: 6 years  - Fertility treatments: n/a - Hormonal therapy: n/a - Breast feeding history: briefly   FH:  - mother had pancreatic cancer at age 62 - her sister had pancreatic cancer at age 65 - her maternal uncle had pancreatic cancer at age 67 - her mother also had breast cancer at age 65  - her younger sister had breast cancer at age 60 [de-identified] : Alex is here for follow up on 6/18/24 for Left LCIS/ALH.   At today's visit she is feeling generally well. She continues Tamoxifen 10 mg daily w/ good tolerance.  No recent vaginal bleeding. Of note her sister has Sjogrens. 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  Recently frustrated w/ difficulty losing weight.  Denies recent breast changes, headaches, dizziness, visual changes, balance issues, CP, cough, SOB, n/v/d, constipation, or new bone pain.   EGD for pancreatic cancer screen 9/2023- normal   Health Care Maintenance: Updated 6/18/24 Mammogram:  4/22/24 @ [facility] (reportedly normal, results requested today); Breast MRI 10/24/23- BIRADS 2  EGD for pancreatic cancer screen 9/2023- normal  Colonoscopy:  last done 2022, normal  Gyn/Pap:  done 2/2023- had endometrial polyp  PCP: Dr. Wilmar Massey, [facility] last seen 10/2023  Dermatology screen: DUE DEXA:  2/15/23- normal bone density  Lung cancer screen: n/a COVID vaccination: s/p 3 doses  Flu vaccine: Nov 2023

## 2024-06-18 NOTE — PHYSICAL EXAM
[Fully active, able to carry on all pre-disease performance without restriction] : Status 0 - Fully active, able to carry on all pre-disease performance without restriction [Normal] : affect appropriate [de-identified] : well appearing female, NAD, pleasant [de-identified] : s/p L breast lumpectomy, L breast keloid at incision, R breast biopsy sites seen, no palpable masses or LAD bilaterally, nodular breasts

## 2024-06-18 NOTE — RESULTS/DATA
[FreeTextEntry1] : #Left breast ALH/LCIS- s/p lumpectomy w/ Dr. Crawford 10/2023- previously followed w/ medical oncology at [facility] Dr. Izquierdo.  Initially presented to establish care w/ med onc at [facility].  Did not tolerate exemestane 2/2 joint pains, weight gain  Switched to tamoxifen 20mg QOD which she tolerates well  Switched to tamoxifen 10 mg po daily w/ good tolerance overall No recent vaginal bleeding She has required D&C twice in the past. We discussed the small <1% risk of endometrial cancer associated with tamoxifen.  OK to continue tamoxifen per GYN.  Recommended rheumatology eval for Sjogrens. Also can try replense, revaree, coconut oil suppository.  DEXA normal 2/2023, continue calcium and vitamin D  Continue breast imaging and f/u with Dr. Crawford Remains on pancreatic cancer screening trial at [facility] - alternating EGD w/ MRI abdomen  EGD for pancreatic cancer screen 9/2023- normal  Discussed wt loss, emotional support/encouragement provided RTC 6 months to see Dr. Pierre as scheduled (f/u sooner prn)  All patient questions answered at today's visit. Patient urged to call the office with any questions or concerns.

## 2024-06-18 NOTE — HISTORY OF PRESENT ILLNESS
[de-identified] : Referred by: Dr. Manuelito Crawford  PCP: Dr. Wilmar Massey  Diagnosis: left breast atypia  Goes by Alex   Ms. Long is a POST-menopausal female who presented at age 58 in 2023 for evaluation of L breast ALH/LCIS.  The patient has a medical history of hypothyroidism, urinary incontinence.   Alex transferred care from [facility] to [facility], follows w/ Dr. Manuelito Crawford (breast surgery) for a history of ALH, LCIS s/p lumpectomy. She was previously following w/ Dr. Izquierdo from medical oncology. Left breast biopsy in 2020 revealed ALH. She underwent L breast lumpectomy w/ Dr. Crawford on 10/23/2020, final pathology revealed LCIS, classic type, intraductal papilloma, extensive fibrocystic changes, one benign intramammary LN. Of note the patient has a history of additional right breast biopsies in the past with all benign findings. She was initiated on exemestane which she did not tolerate due to joint pains and is currently taking tamoxifen 20mg every other day. She is tolerating this well without hot flashes, vaginal dryness or discharge. She did require D&C twice in the past - required a D&C for vaginal bleeding in  and then again in 2023 for hyperplasia/polyp - pathology was benign, she follows w/ Dr. Carrera from gynecology. She had been most recently seen by Dr. Crawford on 23. She is pending repeat breast MRI.   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 She follows on a clinical trial at [facility] for pancreatic cancer screening- has a strong family history of pancreatic cancer- goes for EGD alternating w/ MRI. She is pending EGD in September.  Denied recent headaches, visual changes, balance issues, CP, cough, SOB, n/v/d, constipation, unintentional weight loss or new bone or back pain. She does have chronic sciatica.   Imaging reviewed:  Last mammogram 2023 at [facility] RAD Pending Breast MRI   Pathology reviewed:  L breast biopsy 9/3/2020- intraductal papilloma w/ calcifications, sclerosing adenosis, ductal hyperplasia Additional site- atypical lobular hyperplasia, lobular intraepithelial neoplasm grade I (ANAND 1), sclerosis adenosis, ductal hyperplasia usual type, cystic apocrine metaplasia  L breast lumpectomy 10/23/2020- LCIS, classic type, intraductal papilloma, extensive fibrocystic changes, one benign intramammary LN Negative for invasive carcinoma or DCIS   SH:  - Occupation: works at a [employer] as an   - Living situation: lives in [place] with her  - oldest son lives in [facility], daughter lives in [facility], youngest son lives at home with them  - Smoking/etoh/illicits: never smoker, drinks 1-2 glasses socially, denies illicits  - Exercise: active, does pilates, walking   OB/GYN Hx:  - Age of menarche: 8 - Age of menopause: 54 - Pregnancy history: - ages 26, 24 and 22  - Age at live birth: 30 - OCP use: 6 years  - Fertility treatments: n/a - Hormonal therapy: n/a - Breast feeding history: briefly   FH:  - mother had pancreatic cancer at age 62 - her sister had pancreatic cancer at age 65 - her maternal uncle had pancreatic cancer at age 67 - her mother also had breast cancer at age 65  - her younger sister had breast cancer at age 60 [de-identified] : Alex is here for follow up on 6/18/24 for Left LCIS/ALH.   At today's visit she is feeling generally well. She continues Tamoxifen 10 mg daily w/ good tolerance.  No recent vaginal bleeding. Of note her sister has Sjogrens. She continues to follow at [facility] for pancreatic screening given DEANNE+ mutation.  Recently frustrated w/ difficulty losing weight.  Denies recent breast changes, headaches, dizziness, visual changes, balance issues, CP, cough, SOB, n/v/d, constipation, or new bone pain.   EGD for pancreatic cancer screen 9/2023- normal   Health Care Maintenance: Updated 6/18/24 Mammogram:  4/22/24 @ [facility] (reportedly normal, results requested today); Breast MRI 10/24/23- BIRADS 2  EGD for pancreatic cancer screen 9/2023- normal  Colonoscopy:  last done 2022, normal  Gyn/Pap:  done 2/2023- had endometrial polyp  PCP: Dr. Wilmar Massey, [facility] last seen 10/2023  Dermatology screen: DUE DEXA:  2/15/23- normal bone density  Lung cancer screen: n/a COVID vaccination: s/p 3 doses  Flu vaccine: Nov 2023

## 2024-06-18 NOTE — BEGINNING OF VISIT
[0] : 2) Feeling down, depressed, or hopeless: Not at all (0) [PHQ-2 Negative] : PHQ-2 Negative [[id number]] : 0 [Pain Scale: ___] : On a scale of 1-10, today the patient's pain is a(n) [unfilled]. [Never] : Never [With Patient/Caregiver] : with Patient/Caregiver

## 2024-06-18 NOTE — BEGINNING OF VISIT
[0] : 2) Feeling down, depressed, or hopeless: Not at all (0) [PHQ-2 Negative] : PHQ-2 Negative [[id number]] : 0 [Pain Scale: ___] : On a scale of 1-10, today the patient's pain is a(n) [unfilled]. [Never] : Never [With Patient/Caregiver] : with Patient/Caregiver

## 2024-06-24 ENCOUNTER — APPOINTMENT (OUTPATIENT)
Dept: OBGYN | Facility: CLINIC | Age: 59
End: 2024-06-24

## 2024-07-01 ENCOUNTER — NON-APPOINTMENT (OUTPATIENT)
Age: 59
End: 2024-07-01

## 2024-07-01 ENCOUNTER — APPOINTMENT (OUTPATIENT)
Dept: OPHTHALMOLOGY | Facility: CLINIC | Age: 59
End: 2024-07-01
Payer: COMMERCIAL

## 2024-07-01 PROCEDURE — 92004 COMPRE OPH EXAM NEW PT 1/>: CPT

## 2024-07-02 ENCOUNTER — RX RENEWAL (OUTPATIENT)
Age: 59
End: 2024-07-02

## 2024-07-03 ENCOUNTER — RX RENEWAL (OUTPATIENT)
Age: 59
End: 2024-07-03

## 2024-07-11 ENCOUNTER — APPOINTMENT (OUTPATIENT)
Dept: OBGYN | Facility: CLINIC | Age: 59
End: 2024-07-11
Payer: COMMERCIAL

## 2024-07-11 VITALS — WEIGHT: 200 LBS | BODY MASS INDEX: 32.92 KG/M2 | HEIGHT: 65.5 IN

## 2024-07-11 VITALS — HEART RATE: 82 BPM | SYSTOLIC BLOOD PRESSURE: 131 MMHG | DIASTOLIC BLOOD PRESSURE: 76 MMHG

## 2024-07-11 DIAGNOSIS — R92.30 DENSE BREASTS, UNSPECIFIED: ICD-10-CM

## 2024-07-11 DIAGNOSIS — Z00.00 ENCOUNTER FOR GENERAL ADULT MEDICAL EXAMINATION W/OUT ABNORMAL FINDINGS: ICD-10-CM

## 2024-07-11 DIAGNOSIS — Z12.39 ENCOUNTER FOR OTHER SCREENING FOR MALIGNANT NEOPLASM OF BREAST: ICD-10-CM

## 2024-07-11 LAB
BILIRUB UR QL STRIP: NEGATIVE
COLLECTION METHOD: NORMAL
HCG UR QL: 0 EU/DL
HEMOGLOBIN: 11.6
HGB UR QL STRIP.AUTO: NORMAL
KETONES UR-MCNC: NEGATIVE
LEUKOCYTE ESTERASE UR QL STRIP: NEGATIVE
NITRITE UR QL STRIP: NEGATIVE
PROT UR STRIP-MCNC: NEGATIVE
SP GR UR STRIP: 1

## 2024-07-11 PROCEDURE — 81003 URINALYSIS AUTO W/O SCOPE: CPT | Mod: QW

## 2024-07-11 PROCEDURE — 99396 PREV VISIT EST AGE 40-64: CPT

## 2024-07-11 PROCEDURE — 85018 HEMOGLOBIN: CPT | Mod: QW

## 2024-07-31 ENCOUNTER — APPOINTMENT (OUTPATIENT)
Dept: FAMILY MEDICINE | Facility: CLINIC | Age: 59
End: 2024-07-31
Payer: COMMERCIAL

## 2024-07-31 VITALS
WEIGHT: 205 LBS | OXYGEN SATURATION: 97 % | HEIGHT: 65.5 IN | BODY MASS INDEX: 33.75 KG/M2 | HEART RATE: 73 BPM | SYSTOLIC BLOOD PRESSURE: 124 MMHG | DIASTOLIC BLOOD PRESSURE: 64 MMHG | TEMPERATURE: 97.1 F

## 2024-07-31 DIAGNOSIS — E66.9 OBESITY, UNSPECIFIED: ICD-10-CM

## 2024-07-31 DIAGNOSIS — R73.01 IMPAIRED FASTING GLUCOSE: ICD-10-CM

## 2024-07-31 DIAGNOSIS — R73.03 PREDIABETES.: ICD-10-CM

## 2024-07-31 DIAGNOSIS — M79.661 PAIN IN RIGHT LOWER LEG: ICD-10-CM

## 2024-07-31 DIAGNOSIS — E78.5 HYPERLIPIDEMIA, UNSPECIFIED: ICD-10-CM

## 2024-07-31 DIAGNOSIS — E03.9 HYPOTHYROIDISM, UNSPECIFIED: ICD-10-CM

## 2024-07-31 PROCEDURE — 99214 OFFICE O/P EST MOD 30 MIN: CPT

## 2024-07-31 PROCEDURE — 36415 COLL VENOUS BLD VENIPUNCTURE: CPT

## 2024-07-31 NOTE — PHYSICAL EXAM
[No Edema] : there was no peripheral edema [Normal] : no rash [Coordination Grossly Intact] : coordination grossly intact [No Focal Deficits] : no focal deficits [Normal Gait] : normal gait [Normal Affect] : the affect was normal [Normal Insight/Judgement] : insight and judgment were intact [de-identified] : Right leg:  no gross deformities, erythema, swelling, ecchymosis; no color/size/temperature discrepancy between the bilateral legs; tenderness over the lateral anterior right shin; no knee/ankle tenderness; no calf tenderness; full ROM of joints; distal pulses intact.

## 2024-07-31 NOTE — REVIEW OF SYSTEMS
[Back Pain] : back pain [Negative] : Heme/Lymph [FreeTextEntry2] : difficulty losing weight [FreeTextEntry9] : right shin pain

## 2024-07-31 NOTE — ASSESSMENT
[FreeTextEntry1] : Patient is a 58yo female who presents to the office complaining of chronic/persistent right shin pain >1 year after having shin splints.  Not improving with conservative management/supportive care.  Right leg pain - RX for PT provided. - +/- imaging, specialist referral if symptoms persist/worsen. - No risk factors or signs/symptoms of DVT. - Rest, Ice, Compression (ACE), Elevation. - Ibuprofen every 8 hours as needed for pain, alternate with Acetaminophen every 6 hours as needed.  Obesity - Weight loss assistance discussed with pt, including lack of long-term data on medications available. - Also discussed with pt importance of lifestyle modifications in addition to these medications to assist in weight loss process. - R/B/SE/A discussed. - Denies personal history of pancreatitis.  Denies personal/family history of medullary thyroid disease. - RX for Zepbound sent to pharmacy. - RTO as scheduled in 10/2024 for weight check/rpt fasting labs.  Labs drawn in office.  Pt overdue for fasting labs, performed today.  Pt has upcoming MRI, requesting Xanax RF for claustrophobia.  RX provided as requested.  Call the office or go to the ED immediately if you develop new, worsening or concerning symptoms including high fever, severe headache/worst headache of your life, confusion, dizziness/lightheadedness, loss of consciousness, severe chest pain, difficulty breathing, shortness of breath, severe abdominal pain, excessive vomiting/diarrhea, inability to feel/move the extremities, or any other concerning symptoms.

## 2024-07-31 NOTE — HISTORY OF PRESENT ILLNESS
[FreeTextEntry8] : Pt reports pain in the right lateral anterior shin for the past year or so. Pt had shin splints for the past year or so, is now willing to do PT. Pain is worse with standing from seated, climbing stairs, and gets worse through the day. Pt feels she has mild sciatic pain on the right side as well. Pt wears sneakers with arch support or supportive sandals. Has tried Lidocaine patches with little improvement. Has also taken Advil PM or Aleve with some improvement.  Pt overdue for fasting labs.  Has CPE scheduled in 10/2024.  Pt fasting today so will run labs. Pt requesting Zepbound for weight loss.  Has been struggling to lose weight and feels ready to try medication at this time. Pt has family history of pancreatic cancer but no personal history of pancreatic cancer or pancreatitis.  Pt is enrolled in clinical study through [facility] for pancreatic cancer screening/research and has upcoming MRI scheduled, requesting Xanax for claustrophobia.

## 2024-08-01 LAB
25(OH)D3 SERPL-MCNC: 63.9 NG/ML
ALBUMIN SERPL ELPH-MCNC: 4.4 G/DL
ALP BLD-CCNC: 52 U/L
ALT SERPL-CCNC: 20 U/L
ANION GAP SERPL CALC-SCNC: 12 MMOL/L
AST SERPL-CCNC: 20 U/L
BASOPHILS # BLD AUTO: 0.03 K/UL
BASOPHILS NFR BLD AUTO: 0.6 %
BILIRUB SERPL-MCNC: 0.5 MG/DL
BUN SERPL-MCNC: 18 MG/DL
CALCIUM SERPL-MCNC: 10.2 MG/DL
CHLORIDE SERPL-SCNC: 104 MMOL/L
CHOLEST SERPL-MCNC: 240 MG/DL
CO2 SERPL-SCNC: 26 MMOL/L
CREAT SERPL-MCNC: 0.81 MG/DL
EGFR: 83 ML/MIN/1.73M2
EOSINOPHIL # BLD AUTO: 0.14 K/UL
EOSINOPHIL NFR BLD AUTO: 2.7 %
ESTIMATED AVERAGE GLUCOSE: 103 MG/DL
GLUCOSE SERPL-MCNC: 95 MG/DL
HBA1C MFR BLD HPLC: 5.2 %
HCT VFR BLD CALC: 42.6 %
HDLC SERPL-MCNC: 61 MG/DL
HGB BLD-MCNC: 13.4 G/DL
IMM GRANULOCYTES NFR BLD AUTO: 0.4 %
LDLC SERPL CALC-MCNC: 164 MG/DL
LYMPHOCYTES # BLD AUTO: 1.45 K/UL
LYMPHOCYTES NFR BLD AUTO: 27.6 %
MAN DIFF?: NORMAL
MCHC RBC-ENTMCNC: 30 PG
MCHC RBC-ENTMCNC: 31.5 GM/DL
MCV RBC AUTO: 95.3 FL
MONOCYTES # BLD AUTO: 0.36 K/UL
MONOCYTES NFR BLD AUTO: 6.9 %
NEUTROPHILS # BLD AUTO: 3.25 K/UL
NEUTROPHILS NFR BLD AUTO: 61.8 %
NONHDLC SERPL-MCNC: 179 MG/DL
PLATELET # BLD AUTO: 172 K/UL
POTASSIUM SERPL-SCNC: 4.9 MMOL/L
PROT SERPL-MCNC: 6.6 G/DL
RBC # BLD: 4.47 M/UL
RBC # FLD: 13.4 %
SODIUM SERPL-SCNC: 142 MMOL/L
T4 FREE SERPL-MCNC: 1.4 NG/DL
TRIGL SERPL-MCNC: 87 MG/DL
TSH SERPL-ACNC: 2.53 UIU/ML
WBC # FLD AUTO: 5.25 K/UL

## 2024-08-09 RX ORDER — TIRZEPATIDE 2.5 MG/.5ML
2.5 INJECTION, SOLUTION SUBCUTANEOUS
Qty: 1 | Refills: 3 | Status: ACTIVE | COMMUNITY
Start: 2024-07-31 | End: 1900-01-01

## 2024-10-01 ENCOUNTER — RX RENEWAL (OUTPATIENT)
Age: 59
End: 2024-10-01

## 2024-10-30 ENCOUNTER — APPOINTMENT (OUTPATIENT)
Dept: FAMILY MEDICINE | Facility: CLINIC | Age: 59
End: 2024-10-30

## 2024-10-30 ENCOUNTER — NON-APPOINTMENT (OUTPATIENT)
Age: 59
End: 2024-10-30

## 2024-10-30 ENCOUNTER — MED ADMIN CHARGE (OUTPATIENT)
Age: 59
End: 2024-10-30

## 2024-10-30 VITALS
TEMPERATURE: 97.6 F | OXYGEN SATURATION: 100 % | DIASTOLIC BLOOD PRESSURE: 78 MMHG | HEIGHT: 65.5 IN | BODY MASS INDEX: 31.77 KG/M2 | WEIGHT: 193 LBS | HEART RATE: 87 BPM | SYSTOLIC BLOOD PRESSURE: 124 MMHG

## 2024-10-30 DIAGNOSIS — R73.03 PREDIABETES.: ICD-10-CM

## 2024-10-30 DIAGNOSIS — E66.811 OBESITY, CLASS 1: ICD-10-CM

## 2024-10-30 DIAGNOSIS — R73.01 IMPAIRED FASTING GLUCOSE: ICD-10-CM

## 2024-10-30 DIAGNOSIS — E78.5 HYPERLIPIDEMIA, UNSPECIFIED: ICD-10-CM

## 2024-10-30 DIAGNOSIS — F41.9 ANXIETY DISORDER, UNSPECIFIED: ICD-10-CM

## 2024-10-30 DIAGNOSIS — Z00.00 ENCOUNTER FOR GENERAL ADULT MEDICAL EXAMINATION W/OUT ABNORMAL FINDINGS: ICD-10-CM

## 2024-10-30 DIAGNOSIS — E03.9 HYPOTHYROIDISM, UNSPECIFIED: ICD-10-CM

## 2024-10-30 PROCEDURE — G0008: CPT

## 2024-10-30 PROCEDURE — 99396 PREV VISIT EST AGE 40-64: CPT | Mod: 25

## 2024-10-30 PROCEDURE — 36415 COLL VENOUS BLD VENIPUNCTURE: CPT

## 2024-10-30 PROCEDURE — 93000 ELECTROCARDIOGRAM COMPLETE: CPT

## 2024-10-30 PROCEDURE — 90656 IIV3 VACC NO PRSV 0.5 ML IM: CPT

## 2024-11-04 LAB
25(OH)D3 SERPL-MCNC: 78.5 NG/ML
ALBUMIN SERPL ELPH-MCNC: 4.6 G/DL
ALP BLD-CCNC: 46 U/L
ALT SERPL-CCNC: 28 U/L
ANION GAP SERPL CALC-SCNC: 12 MMOL/L
AST SERPL-CCNC: 25 U/L
BASOPHILS # BLD AUTO: 0.05 K/UL
BASOPHILS NFR BLD AUTO: 0.9 %
BILIRUB SERPL-MCNC: 0.4 MG/DL
BUN SERPL-MCNC: 14 MG/DL
CALCIUM SERPL-MCNC: 9.8 MG/DL
CHLORIDE SERPL-SCNC: 107 MMOL/L
CHOLEST SERPL-MCNC: 190 MG/DL
CO2 SERPL-SCNC: 24 MMOL/L
CREAT SERPL-MCNC: 0.92 MG/DL
EGFR: 71 ML/MIN/1.73M2
EOSINOPHIL # BLD AUTO: 0.13 K/UL
EOSINOPHIL NFR BLD AUTO: 2.4 %
ESTIMATED AVERAGE GLUCOSE: 97 MG/DL
GLUCOSE SERPL-MCNC: 94 MG/DL
HBA1C MFR BLD HPLC: 5 %
HCT VFR BLD CALC: 44.1 %
HDLC SERPL-MCNC: 55 MG/DL
HGB BLD-MCNC: 13.9 G/DL
IMM GRANULOCYTES NFR BLD AUTO: 0.2 %
LDLC SERPL CALC-MCNC: 115 MG/DL
LYMPHOCYTES # BLD AUTO: 1.45 K/UL
LYMPHOCYTES NFR BLD AUTO: 26.5 %
MAN DIFF?: NORMAL
MCHC RBC-ENTMCNC: 30.4 PG
MCHC RBC-ENTMCNC: 31.5 G/DL
MCV RBC AUTO: 96.5 FL
MONOCYTES # BLD AUTO: 0.42 K/UL
MONOCYTES NFR BLD AUTO: 7.7 %
NEUTROPHILS # BLD AUTO: 3.41 K/UL
NEUTROPHILS NFR BLD AUTO: 62.3 %
NONHDLC SERPL-MCNC: 135 MG/DL
PLATELET # BLD AUTO: 183 K/UL
POTASSIUM SERPL-SCNC: 5.9 MMOL/L
PROT SERPL-MCNC: 6.8 G/DL
RBC # BLD: 4.57 M/UL
RBC # FLD: 13.1 %
SODIUM SERPL-SCNC: 144 MMOL/L
T4 FREE SERPL-MCNC: 1.2 NG/DL
TRIGL SERPL-MCNC: 110 MG/DL
TSH SERPL-ACNC: 1.91 UIU/ML
WBC # FLD AUTO: 5.47 K/UL

## 2024-12-03 ENCOUNTER — RX RENEWAL (OUTPATIENT)
Age: 59
End: 2024-12-03

## 2024-12-03 RX ORDER — TIRZEPATIDE 5 MG/.5ML
5 INJECTION, SOLUTION SUBCUTANEOUS
Qty: 1 | Refills: 1 | Status: ACTIVE | COMMUNITY
Start: 2024-12-03 | End: 1900-01-01

## 2024-12-17 ENCOUNTER — APPOINTMENT (OUTPATIENT)
Dept: HEMATOLOGY ONCOLOGY | Facility: CLINIC | Age: 59
End: 2024-12-17
Payer: COMMERCIAL

## 2024-12-17 VITALS
SYSTOLIC BLOOD PRESSURE: 121 MMHG | BODY MASS INDEX: 31.11 KG/M2 | HEART RATE: 77 BPM | OXYGEN SATURATION: 100 % | DIASTOLIC BLOOD PRESSURE: 81 MMHG | WEIGHT: 189 LBS | TEMPERATURE: 97.5 F | HEIGHT: 65.5 IN

## 2024-12-17 DIAGNOSIS — D05.02 LOBULAR CARCINOMA IN SITU OF LEFT BREAST: ICD-10-CM

## 2024-12-17 PROCEDURE — G2211 COMPLEX E/M VISIT ADD ON: CPT

## 2024-12-17 PROCEDURE — 99213 OFFICE O/P EST LOW 20 MIN: CPT

## 2024-12-27 ENCOUNTER — RX RENEWAL (OUTPATIENT)
Age: 59
End: 2024-12-27

## 2025-01-10 ENCOUNTER — NON-APPOINTMENT (OUTPATIENT)
Age: 60
End: 2025-01-10